# Patient Record
Sex: FEMALE | Race: WHITE | Employment: FULL TIME | URBAN - METROPOLITAN AREA
[De-identification: names, ages, dates, MRNs, and addresses within clinical notes are randomized per-mention and may not be internally consistent; named-entity substitution may affect disease eponyms.]

---

## 2017-03-08 ENCOUNTER — HOSPITAL ENCOUNTER (EMERGENCY)
Age: 47
Discharge: HOME | End: 2017-03-08
Attending: EMERGENCY MEDICINE
Payer: COMMERCIAL

## 2017-03-08 ENCOUNTER — APPOINTMENT (OUTPATIENT)
Dept: GENERAL RADIOLOGY | Age: 47
End: 2017-03-08
Attending: EMERGENCY MEDICINE
Payer: COMMERCIAL

## 2017-03-08 VITALS
HEART RATE: 95 BPM | OXYGEN SATURATION: 95 % | BODY MASS INDEX: 37.56 KG/M2 | RESPIRATION RATE: 20 BRPM | DIASTOLIC BLOOD PRESSURE: 84 MMHG | TEMPERATURE: 97.8 F | HEIGHT: 64 IN | WEIGHT: 220 LBS | SYSTOLIC BLOOD PRESSURE: 135 MMHG

## 2017-03-08 DIAGNOSIS — T17.908A FOREIGN BODY ASPIRATION, INITIAL ENCOUNTER: Primary | ICD-10-CM

## 2017-03-08 PROCEDURE — 99284 EMERGENCY DEPT VISIT MOD MDM: CPT | Performed by: EMERGENCY MEDICINE

## 2017-03-08 PROCEDURE — 71020 XR CHEST PA LAT: CPT

## 2017-03-08 NOTE — ED TRIAGE NOTES
Pt was taking vitamins and states she couldn't get it to go down, go choked and then thinks she aspirated it. Pt is clearing throat and appears mildly uncomfortable.
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
## 2019-03-14 ENCOUNTER — HOSPITAL ENCOUNTER (OUTPATIENT)
Dept: MAMMOGRAPHY | Age: 49
Discharge: HOME | End: 2019-03-14
Attending: FAMILY MEDICINE
Payer: COMMERCIAL

## 2019-03-14 DIAGNOSIS — Z12.39 SCREENING BREAST EXAMINATION: ICD-10-CM

## 2019-03-14 PROCEDURE — 77067 SCR MAMMO BI INCL CAD: CPT

## 2019-03-28 ENCOUNTER — HOSPITAL ENCOUNTER (OUTPATIENT)
Dept: MAMMOGRAPHY | Age: 49
Discharge: HOME | End: 2019-03-28
Attending: FAMILY MEDICINE
Payer: COMMERCIAL

## 2019-03-28 DIAGNOSIS — R92.8 ABNORMAL SCREENING MAMMOGRAM: ICD-10-CM

## 2019-03-28 PROCEDURE — 77065 DX MAMMO INCL CAD UNI: CPT

## 2019-04-10 ENCOUNTER — HOSPITAL ENCOUNTER (OUTPATIENT)
Dept: MAMMOGRAPHY | Age: 49
Discharge: HOME | End: 2019-04-10
Attending: FAMILY MEDICINE
Payer: COMMERCIAL

## 2019-04-10 VITALS
SYSTOLIC BLOOD PRESSURE: 143 MMHG | DIASTOLIC BLOOD PRESSURE: 89 MMHG | OXYGEN SATURATION: 97 % | HEART RATE: 84 BPM | TEMPERATURE: 97.5 F

## 2019-04-10 DIAGNOSIS — R92.8 ABNORMAL MAMMOGRAM OF LEFT BREAST: ICD-10-CM

## 2019-04-10 PROCEDURE — 77065 DX MAMMO INCL CAD UNI: CPT

## 2019-04-10 PROCEDURE — 88305 TISSUE EXAM BY PATHOLOGIST: CPT

## 2019-04-10 PROCEDURE — 19081 BX BREAST 1ST LESION STRTCTC: CPT

## 2019-04-10 PROCEDURE — 74011000250 HC RX REV CODE- 250: Performed by: NURSE PRACTITIONER

## 2019-04-10 PROCEDURE — 74011250636 HC RX REV CODE- 250/636: Performed by: NURSE PRACTITIONER

## 2019-04-10 RX ORDER — LIDOCAINE HYDROCHLORIDE 10 MG/ML
10 INJECTION INFILTRATION; PERINEURAL
Status: COMPLETED | OUTPATIENT
Start: 2019-04-10 | End: 2019-04-10

## 2019-04-10 RX ORDER — LIDOCAINE HYDROCHLORIDE 10 MG/ML
5 INJECTION INFILTRATION; PERINEURAL
Status: COMPLETED | OUTPATIENT
Start: 2019-04-10 | End: 2019-04-10

## 2019-04-10 RX ADMIN — LIDOCAINE HYDROCHLORIDE 3 ML: 10 INJECTION, SOLUTION INFILTRATION; PERINEURAL at 11:10

## 2019-04-10 RX ADMIN — LIDOCAINE HYDROCHLORIDE 3 ML: 10; .005 INJECTION, SOLUTION EPIDURAL; INFILTRATION; INTRACAUDAL; PERINEURAL at 11:10

## 2019-04-10 RX ADMIN — LIDOCAINE HYDROCHLORIDE 10 ML: 10 INJECTION, SOLUTION INFILTRATION; PERINEURAL at 11:09

## 2019-04-10 RX ADMIN — SODIUM CHLORIDE 250 ML: 900 INJECTION, SOLUTION INTRAVENOUS at 11:14

## 2019-12-23 PROBLEM — G56.03 CARPAL TUNNEL SYNDROME, BILATERAL: Status: ACTIVE | Noted: 2018-08-13

## 2020-01-04 ENCOUNTER — HOSPITAL ENCOUNTER (EMERGENCY)
Age: 50
Discharge: HOME | End: 2020-01-04
Attending: EMERGENCY MEDICINE
Payer: COMMERCIAL

## 2020-01-04 VITALS
DIASTOLIC BLOOD PRESSURE: 96 MMHG | BODY MASS INDEX: 34.31 KG/M2 | WEIGHT: 201 LBS | HEART RATE: 78 BPM | SYSTOLIC BLOOD PRESSURE: 166 MMHG | RESPIRATION RATE: 18 BRPM | OXYGEN SATURATION: 96 % | TEMPERATURE: 98.2 F | HEIGHT: 64 IN

## 2020-01-04 DIAGNOSIS — M54.31 SCIATICA OF RIGHT SIDE: Primary | ICD-10-CM

## 2020-01-04 PROCEDURE — 99283 EMERGENCY DEPT VISIT LOW MDM: CPT | Performed by: EMERGENCY MEDICINE

## 2020-01-04 PROCEDURE — 74011250636 HC RX REV CODE- 250/636: Performed by: EMERGENCY MEDICINE

## 2020-01-04 PROCEDURE — 96372 THER/PROPH/DIAG INJ SC/IM: CPT | Performed by: EMERGENCY MEDICINE

## 2020-01-04 RX ORDER — PROMETHAZINE HYDROCHLORIDE 25 MG/ML
25 INJECTION, SOLUTION INTRAMUSCULAR; INTRAVENOUS
Status: COMPLETED | OUTPATIENT
Start: 2020-01-04 | End: 2020-01-04

## 2020-01-04 RX ORDER — HYDROMORPHONE HYDROCHLORIDE 1 MG/ML
1 INJECTION, SOLUTION INTRAMUSCULAR; INTRAVENOUS; SUBCUTANEOUS
Status: COMPLETED | OUTPATIENT
Start: 2020-01-04 | End: 2020-01-04

## 2020-01-04 RX ORDER — KETOROLAC TROMETHAMINE 30 MG/ML
60 INJECTION, SOLUTION INTRAMUSCULAR; INTRAVENOUS
Status: COMPLETED | OUTPATIENT
Start: 2020-01-04 | End: 2020-01-04

## 2020-01-04 RX ORDER — CYCLOBENZAPRINE HCL 10 MG
10 TABLET ORAL
Qty: 30 TAB | Refills: 0 | Status: SHIPPED | OUTPATIENT
Start: 2020-01-04 | End: 2020-10-27 | Stop reason: CLARIF

## 2020-01-04 RX ORDER — DEXAMETHASONE SODIUM PHOSPHATE 100 MG/10ML
10 INJECTION INTRAMUSCULAR; INTRAVENOUS ONCE
Status: COMPLETED | OUTPATIENT
Start: 2020-01-04 | End: 2020-01-04

## 2020-01-04 RX ORDER — HYDROCODONE BITARTRATE AND ACETAMINOPHEN 5; 325 MG/1; MG/1
1-2 TABLET ORAL
Qty: 20 TAB | Refills: 0 | Status: SHIPPED | OUTPATIENT
Start: 2020-01-04 | End: 2020-01-07

## 2020-01-04 RX ADMIN — PROMETHAZINE HYDROCHLORIDE 25 MG: 25 INJECTION INTRAMUSCULAR; INTRAVENOUS at 19:44

## 2020-01-04 RX ADMIN — DEXAMETHASONE SODIUM PHOSPHATE 10 MG: 10 INJECTION INTRAMUSCULAR; INTRAVENOUS at 19:46

## 2020-01-04 RX ADMIN — HYDROMORPHONE HYDROCHLORIDE 1 MG: 1 INJECTION, SOLUTION INTRAMUSCULAR; INTRAVENOUS; SUBCUTANEOUS at 19:46

## 2020-01-04 RX ADMIN — KETOROLAC TROMETHAMINE 60 MG: 30 INJECTION, SOLUTION INTRAMUSCULAR at 19:46

## 2020-01-04 NOTE — ED TRIAGE NOTES
Patient advises she was attempting to get a lid out from under the bottom cabinet and felt a pain in her back. Patient advises lower back pain with radiation to right side. No urinary complaints.
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
## 2020-01-05 NOTE — DISCHARGE INSTRUCTIONS
Use cold packs 5 to 10 minutes, every hour to every-other-hour, for first 48 hours,  Then switch to a heating pad, 5 to 10 minutes, every hour to every-other-hour, for a few days,  Do not go to heat, right away    you should rest today, flat on your back for 45 minutes, and then stand up straight for 15 minutes  Then flat on her back again for 45 minutes, then stand up straight for 15 minutes. If you stay flat on your back all day that will increase the swelling  Continue to repeat this throughout today    Avoid The seated position as much as possible      Patient Education        Back Pain: Care Instructions  Your Care Instructions    Back pain has many possible causes. It is often related to problems with muscles and ligaments of the back. It may also be related to problems with the nerves, discs, or bones of the back. Moving, lifting, standing, sitting, or sleeping in an awkward way can strain the back. Sometimes you don't notice the injury until later. Arthritis is another common cause of back pain. Although it may hurt a lot, back pain usually improves on its own within several weeks. Most people recover in 12 weeks or less. Using good home treatment and being careful not to stress your back can help you feel better sooner. Follow-up care is a key part of your treatment and safety. Be sure to make and go to all appointments, and call your doctor if you are having problems. It's also a good idea to know your test results and keep a list of the medicines you take. How can you care for yourself at home? · Sit or lie in positions that are most comfortable and reduce your pain. Try one of these positions when you lie down:  ? Lie on your back with your knees bent and supported by large pillows. ? Lie on the floor with your legs on the seat of a sofa or chair. ? Lie on your side with your knees and hips bent and a pillow between your legs. ? Lie on your stomach if it does not make pain worse.   · Do not sit up in bed, and avoid soft couches and twisted positions. Bed rest can help relieve pain at first, but it delays healing. Avoid bed rest after the first day of back pain. · Change positions every 30 minutes. If you must sit for long periods of time, take breaks from sitting. Get up and walk around, or lie in a comfortable position. · Try using a heating pad on a low or medium setting for 15 to 20 minutes every 2 or 3 hours. Try a warm shower in place of one session with the heating pad. · You can also try an ice pack for 10 to 15 minutes every 2 to 3 hours. Put a thin cloth between the ice pack and your skin. · Take pain medicines exactly as directed. ? If the doctor gave you a prescription medicine for pain, take it as prescribed. ? If you are not taking a prescription pain medicine, ask your doctor if you can take an over-the-counter medicine. · Take short walks several times a day. You can start with 5 to 10 minutes, 3 or 4 times a day, and work up to longer walks. Walk on level surfaces and avoid hills and stairs until your back is better. · Return to work and other activities as soon as you can. Continued rest without activity is usually not good for your back. · To prevent future back pain, do exercises to stretch and strengthen your back and stomach. Learn how to use good posture, safe lifting techniques, and proper body mechanics. When should you call for help? Call your doctor now or seek immediate medical care if:    · You have new or worsening numbness in your legs.     · You have new or worsening weakness in your legs. (This could make it hard to stand up.)     · You lose control of your bladder or bowels.    Watch closely for changes in your health, and be sure to contact your doctor if:    · You have a fever, lose weight, or don't feel well.     · You do not get better as expected. Where can you learn more? Go to http://alicia-tavo.info/.   Enter F225 in the search box to learn more about \"Back Pain: Care Instructions. \"  Current as of: June 26, 2019  Content Version: 12.2  © 7639-2792 [employer]. Care instructions adapted under license by [employer] (which disclaims liability or warranty for this information). If you have questions about a medical condition or this instruction, always ask your healthcare professional. [address] any warranty or liability for your use of this information.

## 2020-10-16 ENCOUNTER — HOSPITAL ENCOUNTER (OUTPATIENT)
Dept: MAMMOGRAPHY | Age: 50
Discharge: HOME | End: 2020-10-16
Attending: FAMILY MEDICINE
Payer: COMMERCIAL

## 2020-10-16 DIAGNOSIS — Z12.39 SCREENING FOR BREAST CANCER: ICD-10-CM

## 2020-10-16 PROCEDURE — 77067 SCR MAMMO BI INCL CAD: CPT

## 2020-10-30 ENCOUNTER — ANESTHESIA EVENT (OUTPATIENT)
Dept: SURGERY | Age: 50
End: 2020-10-30
Payer: OTHER MISCELLANEOUS

## 2020-10-30 ENCOUNTER — ANESTHESIA (OUTPATIENT)
Dept: SURGERY | Age: 50
End: 2020-10-30
Payer: OTHER MISCELLANEOUS

## 2020-10-30 ENCOUNTER — HOSPITAL ENCOUNTER (OUTPATIENT)
Age: 50
Setting detail: OUTPATIENT SURGERY
Discharge: HOME | End: 2020-10-30
Attending: ORTHOPAEDIC SURGERY | Admitting: ORTHOPAEDIC SURGERY
Payer: OTHER MISCELLANEOUS

## 2020-10-30 VITALS
BODY MASS INDEX: 33.47 KG/M2 | RESPIRATION RATE: 16 BRPM | TEMPERATURE: 98.3 F | OXYGEN SATURATION: 92 % | HEART RATE: 67 BPM | SYSTOLIC BLOOD PRESSURE: 132 MMHG | DIASTOLIC BLOOD PRESSURE: 66 MMHG | WEIGHT: 195 LBS

## 2020-10-30 PROCEDURE — 74011250636 HC RX REV CODE- 250/636: Performed by: NURSE ANESTHETIST, CERTIFIED REGISTERED

## 2020-10-30 PROCEDURE — 76210000020 HC REC RM PH II FIRST 0.5 HR: Performed by: ORTHOPAEDIC SURGERY

## 2020-10-30 PROCEDURE — 77030010509 HC AIRWY LMA MSK TELE -A: Performed by: ANESTHESIOLOGY

## 2020-10-30 PROCEDURE — 2709999900 HC NON-CHARGEABLE SUPPLY: Performed by: ORTHOPAEDIC SURGERY

## 2020-10-30 PROCEDURE — 77030019908 HC STETH ESOPH SIMS -A: Performed by: ANESTHESIOLOGY

## 2020-10-30 PROCEDURE — 74011250636 HC RX REV CODE- 250/636: Performed by: PHYSICIAN ASSISTANT

## 2020-10-30 PROCEDURE — 77030000032 HC CUF TRNQT ZIMM -B: Performed by: ORTHOPAEDIC SURGERY

## 2020-10-30 PROCEDURE — 74011000250 HC RX REV CODE- 250: Performed by: NURSE ANESTHETIST, CERTIFIED REGISTERED

## 2020-10-30 PROCEDURE — 76210000016 HC OR PH I REC 1 TO 1.5 HR: Performed by: ORTHOPAEDIC SURGERY

## 2020-10-30 PROCEDURE — 74011250636 HC RX REV CODE- 250/636: Performed by: ANESTHESIOLOGY

## 2020-10-30 PROCEDURE — 77030020782 HC GWN BAIR PAWS FLX 3M -B: Performed by: ANESTHESIOLOGY

## 2020-10-30 PROCEDURE — 77030011930 HC WND ARTHRO ABLT S&N -C: Performed by: ORTHOPAEDIC SURGERY

## 2020-10-30 PROCEDURE — 76010000138 HC OR TIME 0.5 TO 1 HR: Performed by: ORTHOPAEDIC SURGERY

## 2020-10-30 PROCEDURE — 76060000032 HC ANESTHESIA 0.5 TO 1 HR: Performed by: ORTHOPAEDIC SURGERY

## 2020-10-30 PROCEDURE — 74011250636 HC RX REV CODE- 250/636: Performed by: ORTHOPAEDIC SURGERY

## 2020-10-30 PROCEDURE — 74011250637 HC RX REV CODE- 250/637: Performed by: ANESTHESIOLOGY

## 2020-10-30 PROCEDURE — 77030006668 HC BLD SHV MENSCS STRY -B: Performed by: ORTHOPAEDIC SURGERY

## 2020-10-30 RX ORDER — MIDAZOLAM HYDROCHLORIDE 1 MG/ML
2 INJECTION, SOLUTION INTRAMUSCULAR; INTRAVENOUS
Status: DISCONTINUED | OUTPATIENT
Start: 2020-10-30 | End: 2020-10-30 | Stop reason: HOSPADM

## 2020-10-30 RX ORDER — DIPHENHYDRAMINE HYDROCHLORIDE 50 MG/ML
12.5 INJECTION, SOLUTION INTRAMUSCULAR; INTRAVENOUS
Status: DISCONTINUED | OUTPATIENT
Start: 2020-10-30 | End: 2020-10-30 | Stop reason: HOSPADM

## 2020-10-30 RX ORDER — LIDOCAINE HYDROCHLORIDE 20 MG/ML
INJECTION, SOLUTION EPIDURAL; INFILTRATION; INTRACAUDAL; PERINEURAL AS NEEDED
Status: DISCONTINUED | OUTPATIENT
Start: 2020-10-30 | End: 2020-10-30 | Stop reason: HOSPADM

## 2020-10-30 RX ORDER — SODIUM CHLORIDE 0.9 % (FLUSH) 0.9 %
5-40 SYRINGE (ML) INJECTION AS NEEDED
Status: DISCONTINUED | OUTPATIENT
Start: 2020-10-30 | End: 2020-10-30 | Stop reason: HOSPADM

## 2020-10-30 RX ORDER — OXYCODONE AND ACETAMINOPHEN 5; 325 MG/1; MG/1
1 TABLET ORAL AS NEEDED
Status: DISCONTINUED | OUTPATIENT
Start: 2020-10-30 | End: 2020-10-30 | Stop reason: HOSPADM

## 2020-10-30 RX ORDER — SODIUM CHLORIDE, SODIUM LACTATE, POTASSIUM CHLORIDE, CALCIUM CHLORIDE 600; 310; 30; 20 MG/100ML; MG/100ML; MG/100ML; MG/100ML
75 INJECTION, SOLUTION INTRAVENOUS CONTINUOUS
Status: DISCONTINUED | OUTPATIENT
Start: 2020-10-30 | End: 2020-10-30 | Stop reason: HOSPADM

## 2020-10-30 RX ORDER — LIDOCAINE HYDROCHLORIDE 10 MG/ML
0.1 INJECTION INFILTRATION; PERINEURAL AS NEEDED
Status: DISCONTINUED | OUTPATIENT
Start: 2020-10-30 | End: 2020-10-30 | Stop reason: HOSPADM

## 2020-10-30 RX ORDER — SODIUM CHLORIDE 9 MG/ML
50 INJECTION, SOLUTION INTRAVENOUS CONTINUOUS
Status: DISCONTINUED | OUTPATIENT
Start: 2020-10-30 | End: 2020-10-30 | Stop reason: HOSPADM

## 2020-10-30 RX ORDER — ROPIVACAINE HYDROCHLORIDE 5 MG/ML
INJECTION, SOLUTION EPIDURAL; INFILTRATION; PERINEURAL AS NEEDED
Status: DISCONTINUED | OUTPATIENT
Start: 2020-10-30 | End: 2020-10-30 | Stop reason: HOSPADM

## 2020-10-30 RX ORDER — PROPOFOL 10 MG/ML
INJECTION, EMULSION INTRAVENOUS AS NEEDED
Status: DISCONTINUED | OUTPATIENT
Start: 2020-10-30 | End: 2020-10-30 | Stop reason: HOSPADM

## 2020-10-30 RX ORDER — FENTANYL CITRATE 50 UG/ML
25 INJECTION, SOLUTION INTRAMUSCULAR; INTRAVENOUS ONCE
Status: DISCONTINUED | OUTPATIENT
Start: 2020-10-30 | End: 2020-10-30 | Stop reason: HOSPADM

## 2020-10-30 RX ORDER — SODIUM CHLORIDE, SODIUM LACTATE, POTASSIUM CHLORIDE, CALCIUM CHLORIDE 600; 310; 30; 20 MG/100ML; MG/100ML; MG/100ML; MG/100ML
100 INJECTION, SOLUTION INTRAVENOUS CONTINUOUS
Status: DISCONTINUED | OUTPATIENT
Start: 2020-10-30 | End: 2020-10-30 | Stop reason: HOSPADM

## 2020-10-30 RX ORDER — SODIUM CHLORIDE 0.9 % (FLUSH) 0.9 %
5-40 SYRINGE (ML) INJECTION EVERY 8 HOURS
Status: DISCONTINUED | OUTPATIENT
Start: 2020-10-30 | End: 2020-10-30 | Stop reason: HOSPADM

## 2020-10-30 RX ORDER — CETIRIZINE HCL 10 MG
10 TABLET ORAL
COMMUNITY

## 2020-10-30 RX ORDER — OXYCODONE HYDROCHLORIDE 5 MG/1
5 TABLET ORAL
Status: COMPLETED | OUTPATIENT
Start: 2020-10-30 | End: 2020-10-30

## 2020-10-30 RX ORDER — MIDAZOLAM HYDROCHLORIDE 1 MG/ML
2 INJECTION, SOLUTION INTRAMUSCULAR; INTRAVENOUS ONCE
Status: DISCONTINUED | OUTPATIENT
Start: 2020-10-30 | End: 2020-10-30 | Stop reason: HOSPADM

## 2020-10-30 RX ORDER — HYDROMORPHONE HYDROCHLORIDE 2 MG/ML
0.5 INJECTION, SOLUTION INTRAMUSCULAR; INTRAVENOUS; SUBCUTANEOUS
Status: DISCONTINUED | OUTPATIENT
Start: 2020-10-30 | End: 2020-10-30 | Stop reason: HOSPADM

## 2020-10-30 RX ORDER — DEXAMETHASONE SODIUM PHOSPHATE 4 MG/ML
INJECTION, SOLUTION INTRA-ARTICULAR; INTRALESIONAL; INTRAMUSCULAR; INTRAVENOUS; SOFT TISSUE AS NEEDED
Status: DISCONTINUED | OUTPATIENT
Start: 2020-10-30 | End: 2020-10-30 | Stop reason: HOSPADM

## 2020-10-30 RX ORDER — CEFAZOLIN SODIUM/WATER 2 G/20 ML
2 SYRINGE (ML) INTRAVENOUS ONCE
Status: COMPLETED | OUTPATIENT
Start: 2020-10-30 | End: 2020-10-30

## 2020-10-30 RX ORDER — FAMOTIDINE 20 MG/1
20 TABLET, FILM COATED ORAL ONCE
Status: COMPLETED | OUTPATIENT
Start: 2020-10-30 | End: 2020-10-30

## 2020-10-30 RX ORDER — FENTANYL CITRATE 50 UG/ML
INJECTION, SOLUTION INTRAMUSCULAR; INTRAVENOUS AS NEEDED
Status: DISCONTINUED | OUTPATIENT
Start: 2020-10-30 | End: 2020-10-30 | Stop reason: HOSPADM

## 2020-10-30 RX ORDER — ONDANSETRON 2 MG/ML
INJECTION INTRAMUSCULAR; INTRAVENOUS AS NEEDED
Status: DISCONTINUED | OUTPATIENT
Start: 2020-10-30 | End: 2020-10-30 | Stop reason: HOSPADM

## 2020-10-30 RX ADMIN — FENTANYL CITRATE 25 MCG: 50 INJECTION INTRAMUSCULAR; INTRAVENOUS at 07:47

## 2020-10-30 RX ADMIN — FENTANYL CITRATE 25 MCG: 50 INJECTION INTRAMUSCULAR; INTRAVENOUS at 07:58

## 2020-10-30 RX ADMIN — HYDROMORPHONE HYDROCHLORIDE 0.5 MG: 2 INJECTION INTRAMUSCULAR; INTRAVENOUS; SUBCUTANEOUS at 08:34

## 2020-10-30 RX ADMIN — PROPOFOL 200 MG: 10 INJECTION, EMULSION INTRAVENOUS at 07:38

## 2020-10-30 RX ADMIN — LIDOCAINE HYDROCHLORIDE 40 MG: 20 INJECTION, SOLUTION EPIDURAL; INFILTRATION; INTRACAUDAL; PERINEURAL at 07:38

## 2020-10-30 RX ADMIN — HYDROMORPHONE HYDROCHLORIDE 0.5 MG: 2 INJECTION INTRAMUSCULAR; INTRAVENOUS; SUBCUTANEOUS at 08:55

## 2020-10-30 RX ADMIN — Medication 2 G: at 07:39

## 2020-10-30 RX ADMIN — PROPOFOL 50 MG: 10 INJECTION, EMULSION INTRAVENOUS at 07:45

## 2020-10-30 RX ADMIN — FAMOTIDINE 20 MG: 20 TABLET, FILM COATED ORAL at 06:45

## 2020-10-30 RX ADMIN — SODIUM CHLORIDE, SODIUM LACTATE, POTASSIUM CHLORIDE, AND CALCIUM CHLORIDE 75 ML/HR: 600; 310; 30; 20 INJECTION, SOLUTION INTRAVENOUS at 06:45

## 2020-10-30 RX ADMIN — ONDANSETRON 4 MG: 2 INJECTION INTRAMUSCULAR; INTRAVENOUS at 07:48

## 2020-10-30 RX ADMIN — OXYCODONE 5 MG: 5 TABLET ORAL at 08:33

## 2020-10-30 RX ADMIN — FENTANYL CITRATE 50 MCG: 50 INJECTION INTRAMUSCULAR; INTRAVENOUS at 07:38

## 2020-10-30 RX ADMIN — SODIUM CHLORIDE, SODIUM LACTATE, POTASSIUM CHLORIDE, AND CALCIUM CHLORIDE: 600; 310; 30; 20 INJECTION, SOLUTION INTRAVENOUS at 07:34

## 2020-10-30 RX ADMIN — DEXAMETHASONE SODIUM PHOSPHATE 4 MG: 4 INJECTION, SOLUTION INTRAMUSCULAR; INTRAVENOUS at 07:48

## 2020-10-30 NOTE — ANESTHESIA POSTPROCEDURE EVALUATION
Procedure(s):  KNEE ARTHROSCOPY RIGHT/MEDIAL MENISCECTOMY. general    Anesthesia Post Evaluation      Multimodal analgesia: multimodal analgesia used between 6 hours prior to anesthesia start to PACU discharge  Patient location during evaluation: bedside  Patient participation: complete - patient participated  Level of consciousness: awake  Pain management: adequate  Airway patency: patent  Anesthetic complications: no  Cardiovascular status: acceptable and stable  Respiratory status: acceptable and room air  Hydration status: acceptable  Post anesthesia nausea and vomiting:  none      INITIAL Post-op Vital signs:   Vitals Value Taken Time   /77 10/30/2020  9:11 AM   Temp     Pulse 65 10/30/2020  9:11 AM   Resp 16 10/30/2020  8:52 AM   SpO2 94 % 10/30/2020  9:11 AM   Vitals shown include unvalidated device data.

## 2020-10-30 NOTE — OP NOTES
[facility]  OPERATIVE REPORT    Name:  Aguilar Moore  MR#:  [id number]  :  1970  ACCOUNT #:  [de-identified]  DATE OF SERVICE:  10/30/2020    PREOPERATIVE DIAGNOSES:  1. Chondromalacia of patella - patellofemoral compartment. 2.  Medial meniscal tear and chondromalacia of medial femoral condyle - medial compartment, right knee. POSTOPERATIVE DIAGNOSES:  1. Chondromalacia of patella - patellofemoral compartment. 2.  Medial meniscal tear and chondromalacia of medial femoral condyle - medial compartment, right knee. PROCEDURE PERFORMED:  1. Abrasion arthroplasty of patella - patellofemoral compartment - CPT 29882.  2.  Partial medial meniscectomy and chondroplasty of medial femoral condyle - medial compartment - CPT 33683, right knee. SURGEON:  Donita Glover MD    ASSISTANT:  ANDRE Moreno    ANESTHESIA:  General.    FLUIDS:  Crystalloid. COMPLICATIONS:  None. SPECIMENS REMOVED:  None. IMPLANTS:  None. ESTIMATED BLOOD LOSS:  Minimal.    FINDINGS:  There was grade II, III, IV chondromalacia of the patella, 2 x 2 cm, and grade II, III chondromalacia of the medial femoral condyle, 1.5 x 1.5 cm. There was tearing of the posterior horn of the medial meniscus. PROCEDURE:  After informed consent, the patient was brought to the operating room and placed on the operating room table in the supine position. General anesthesia was administered without difficulty. Tourniquet was applied to the right upper thigh. Right knee and leg were prepped and draped in sterile fashion. Tourniquet was inflated. Standard inferomedial and inferolateral portals were made for scope and instruments. The knee was then arthroscoped in a sequential manner. The aforementioned findings were noted. Attention was directed to the patellofemoral compartment. A chondroplasty of the patella was performed. All loose cartilage flaps were removed.   There were no sharp edges or unstable fragments after the chondroplasty. There was an area of exposed bone and a contained defect on the patella. Abrasion arthroplasty was performed down to bleeding subchondral bone without difficulty. Attention was directed to the medial compartment of the knee. A partial medial meniscectomy was performed. All of the torn portion was removed. At the termination of the partial medial meniscectomy, the remaining meniscal rim had a smooth contour. There were no sharp edges or unstable fragments. A chondroplasty of the medial femoral condyle was performed back to smooth stable area. The knee was thoroughly irrigated. Portals were closed. Sterile dressings were applied. The patient was extubated and taken to the recovery room in stable condition. ANDRE Newby, assisted during the procedure. He was necessary for patient positioning during the procedure and assistance with the major portions of the operation. His presence decreased the operative time and potential complication rate.       MD BRII [id number]  D:  10/30/2020 8:25  T:  10/30/2020 8:52  JOB #:  [id number]

## 2020-10-30 NOTE — ANESTHESIA PREPROCEDURE EVALUATION
Relevant Problems   No relevant active problems       Anesthetic History   No history of anesthetic complications            Review of Systems / Medical History  Patient summary reviewed and pertinent labs reviewed    Pulmonary        Sleep apnea: CPAP           Neuro/Psych         Psychiatric history     Cardiovascular    Hypertension: well controlled              Exercise tolerance: >4 METS     GI/Hepatic/Renal  Within defined limits              Endo/Other        Obesity     Other Findings              Physical Exam    Airway  Mallampati: I  TM Distance: 4 - 6 cm  Neck ROM: normal range of motion   Mouth opening: Normal     Cardiovascular  Regular rate and rhythm,  S1 and S2 normal,  no murmur, click, rub, or gallop  Rhythm: regular  Rate: normal         Dental  No notable dental hx       Pulmonary  Breath sounds clear to auscultation               Abdominal  Abdominal exam normal       Other Findings            Anesthetic Plan    ASA: 2  Anesthesia type: general          Induction: Intravenous  Anesthetic plan and risks discussed with: Patient

## 2020-10-30 NOTE — DISCHARGE INSTRUCTIONS
Post-Operative Instructions   For  Knee Arthroscopy  Phone:  [phone]    1. Unless otherwise instructed, you may place as much weight on your leg as you wish. 2. If you do not have an \"Iceman\" type cooling unit, for the first 48-72 hours following surgery, use ice on the knee every two hours (while awake) for 20-30 minutes at a time to help prevent swelling and lessen pain. If you have a cooling unit, follow the instructions given to you- continually as much as possible the first 48-72 hours, then 3-4 times a day for 4 weeks. Elevate leg. 3. Perform at least 30 to 50 leg-raising exercises twice a day. Begin exercise as soon as pain allows. Also perform gentle active motion of the knee as soon as pain allows. 4. On the third day after surgery, remove the dressing. Leave steri-strips on, they eventually will peel off.      5. Use any pain medication as instructed. You should take your pain medication as soon as you feel the anesthetic wearing off. Do not wait until you are in severe pain to begin taking your pain medication. 6. You may have some side effects from your pain medication. If you have nausea, try taking your medication with food. For itching, you may take over the counter Benadryl. 7. You may shower as soon as desired. The first three days after surgery, wrap the dressings in saran wrap to keep them dry when showering. 8. You may have been given a prescription for Zofran or Phenergan. This medication is used for nausea and vomiting. You do not need to get this prescription filled unless you have a problem. 9. If you have a problem, please call [address] [place] [address] at [phone]    [address] ACTIVITY  · As tolerated and as directed by your doctor. · Bathe or shower as directed by your doctor. DIET  · Clear liquids until no nausea or vomiting; then light diet for the first day.   · Advance to regular diet on second day, unless your doctor orders otherwise. · If nausea and vomiting continues, call your doctor. PAIN  · Take pain medication as directed by your doctor. · Call your doctor if pain is NOT relieved by medication. · DO NOT take aspirin of blood thinners unless directed by your doctor. CALL YOUR DOCTOR IF   · Excessive bleeding that does not stop after holding pressure over the area  · Temperature of 101 degrees F or above  · Excessive redness, swelling or bruising, and/ or green or yellow, smelly discharge from incision        After general anesthesia or intravenous sedation, for 24 hours or while taking prescription Narcotics:  · Limit your activities  · Do not drive and operate hazardous machinery  · Do not make important personal or business decisions  · Do  not drink alcoholic beverages  · If you have not urinated within 8 hours after discharge, please contact your surgeon on call. *  Please give a list of your current medications to your Primary Care Provider. *  Please update this list whenever your medications are discontinued, doses are      changed, or new medications (including over-the-counter products) are added. *  Please carry medication information at all times in case of emergency situations. These are general instructions for a healthy lifestyle:    No smoking/ No tobacco products/ Avoid exposure to second hand smoke    Surgeon General's Warning:  Quitting smoking now greatly reduces serious risk to your health.     Obesity, smoking, and sedentary lifestyle greatly increases your risk for illness    A healthy diet, regular physical exercise & weight monitoring are important for maintaining a healthy lifestyle    You may be retaining fluid if you have a history of heart failure or if you experience any of the following symptoms:  Weight gain of 3 pounds or more overnight or 5 pounds in a week, increased swelling in our hands or feet or shortness of breath while lying flat in bed. Please call your doctor as soon as you notice any of these symptoms; do not wait until your next office visit. Recognize signs and symptoms of STROKE:    F-face looks uneven    A-arms unable to move or move unevenly    S-speech slurred or non-existent    T-time-call 911 as soon as signs and symptoms begin-DO NOT go       Back to bed or wait to see if you get better-TIME IS BRAIN.
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
## 2021-07-27 ENCOUNTER — APPOINTMENT (OUTPATIENT)
Dept: GENERAL RADIOLOGY | Age: 51
End: 2021-07-27
Attending: PHYSICIAN ASSISTANT
Payer: COMMERCIAL

## 2021-07-27 ENCOUNTER — HOSPITAL ENCOUNTER (EMERGENCY)
Age: 51
Discharge: HOME | End: 2021-07-27
Attending: EMERGENCY MEDICINE
Payer: COMMERCIAL

## 2021-07-27 VITALS
RESPIRATION RATE: 18 BRPM | HEIGHT: 64 IN | TEMPERATURE: 97.9 F | HEART RATE: 82 BPM | SYSTOLIC BLOOD PRESSURE: 142 MMHG | DIASTOLIC BLOOD PRESSURE: 73 MMHG | OXYGEN SATURATION: 99 % | BODY MASS INDEX: 33.47 KG/M2

## 2021-07-27 DIAGNOSIS — S61.213A LACERATION OF LEFT MIDDLE FINGER WITHOUT DAMAGE TO NAIL, FOREIGN BODY PRESENCE UNSPECIFIED, INITIAL ENCOUNTER: Primary | ICD-10-CM

## 2021-07-27 PROCEDURE — 73140 X-RAY EXAM OF FINGER(S): CPT

## 2021-07-27 PROCEDURE — 99284 EMERGENCY DEPT VISIT MOD MDM: CPT

## 2021-07-27 RX ORDER — HYDROCODONE BITARTRATE AND ACETAMINOPHEN 5; 325 MG/1; MG/1
1 TABLET ORAL
Qty: 10 TABLET | Refills: 0 | Status: SHIPPED | OUTPATIENT
Start: 2021-07-27 | End: 2021-07-30

## 2021-07-27 RX ORDER — NALOXONE HYDROCHLORIDE 4 MG/.1ML
SPRAY NASAL
Qty: 1 EACH | Refills: 0 | Status: SHIPPED | OUTPATIENT
Start: 2021-07-27 | End: 2021-08-13

## 2021-07-27 NOTE — ED NOTES
Pt c/o left middle pain with bruising and laceration after getting it caught while taking down a tent at 0930 this morning. Bleeding controlled in triage.

## 2021-07-27 NOTE — Clinical Note
[address] [address] EMERGENCY DEPT  [facility] [address]  [phone]    Work/School Note    Date: 7/27/2021    To Whom It May concern:    Dimitry Jacobson was seen and treated today in the emergency room by the following provider(s):  Attending Provider: Liz Stanford MD  Physician Assistant: Clotilda Litten, PA. Dimitry Jacobson is excused from work/school on 7/27/2021 through 7/30/2021. She is medically clear to return to work/school on 7/31/2021.         Sincerely,          ANDRE Barbosa

## 2021-07-27 NOTE — ED NOTES
I have reviewed discharge instructions with the patient. The patient verbalized understanding. Patient left ED via Discharge Method: ambulatory to Home. Opportunity for questions and clarification provided. Patient given 2 scripts. To continue your aftercare when you leave the hospital, you may receive an automated call from our care team to check in on how you are doing. This is a free service and part of our promise to provide the best care and service to meet your aftercare needs.  If you have questions, or wish to unsubscribe from this service please call [phone]. Thank you for Choosing our [facility] Emergency Department.
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
## 2021-09-17 ENCOUNTER — TRANSCRIBE ORDER (OUTPATIENT)
Dept: REGISTRATION | Age: 51
End: 2021-09-17

## 2021-09-17 DIAGNOSIS — Z12.31 SCREENING MAMMOGRAM FOR HIGH-RISK PATIENT: Primary | ICD-10-CM

## 2021-11-01 ENCOUNTER — HOSPITAL ENCOUNTER (OUTPATIENT)
Dept: MAMMOGRAPHY | Age: 51
Discharge: HOME | End: 2021-11-01
Attending: FAMILY MEDICINE
Payer: COMMERCIAL

## 2021-11-01 DIAGNOSIS — Z12.31 SCREENING MAMMOGRAM FOR HIGH-RISK PATIENT: ICD-10-CM

## 2021-11-01 PROCEDURE — 77067 SCR MAMMO BI INCL CAD: CPT

## 2022-03-18 PROBLEM — G56.03 CARPAL TUNNEL SYNDROME, BILATERAL: Status: ACTIVE | Noted: 2018-08-13

## 2022-03-28 ENCOUNTER — HOSPITAL ENCOUNTER (OUTPATIENT)
Dept: LAB | Age: 52
Discharge: HOME | End: 2022-03-28

## 2022-03-28 PROCEDURE — 88305 TISSUE EXAM BY PATHOLOGIST: CPT

## 2022-07-04 ENCOUNTER — HOSPITAL ENCOUNTER (EMERGENCY)
Age: 52
Discharge: HOME | End: 2022-07-04
Attending: EMERGENCY MEDICINE
Payer: COMMERCIAL

## 2022-07-04 VITALS
OXYGEN SATURATION: 96 % | WEIGHT: 179 LBS | DIASTOLIC BLOOD PRESSURE: 80 MMHG | HEART RATE: 86 BPM | RESPIRATION RATE: 17 BRPM | SYSTOLIC BLOOD PRESSURE: 126 MMHG | TEMPERATURE: 98.6 F | BODY MASS INDEX: 30.56 KG/M2 | HEIGHT: 64 IN

## 2022-07-04 DIAGNOSIS — T63.443A BEE STING, ASSAULT, INITIAL ENCOUNTER: Primary | ICD-10-CM

## 2022-07-04 PROCEDURE — 6370000000 HC RX 637 (ALT 250 FOR IP): Performed by: EMERGENCY MEDICINE

## 2022-07-04 PROCEDURE — 96372 THER/PROPH/DIAG INJ SC/IM: CPT

## 2022-07-04 PROCEDURE — 99284 EMERGENCY DEPT VISIT MOD MDM: CPT

## 2022-07-04 PROCEDURE — 6360000002 HC RX W HCPCS: Performed by: EMERGENCY MEDICINE

## 2022-07-04 RX ORDER — FAMOTIDINE 20 MG/1
20 TABLET, FILM COATED ORAL
Status: COMPLETED | OUTPATIENT
Start: 2022-07-04 | End: 2022-07-04

## 2022-07-04 RX ORDER — METHYLPREDNISOLONE 4 MG/1
TABLET ORAL
Qty: 1 KIT | Refills: 0 | Status: SHIPPED | OUTPATIENT
Start: 2022-07-04 | End: 2022-09-01 | Stop reason: ALTCHOICE

## 2022-07-04 RX ORDER — HYDROXYZINE HYDROCHLORIDE 25 MG/1
25 TABLET, FILM COATED ORAL EVERY 8 HOURS PRN
Qty: 30 TABLET | Refills: 0 | Status: SHIPPED | OUTPATIENT
Start: 2022-07-04 | End: 2022-07-14

## 2022-07-04 RX ORDER — EPINEPHRINE 0.3 MG/.3ML
0.3 INJECTION SUBCUTANEOUS ONCE
Qty: 0.3 ML | Refills: 0 | Status: SHIPPED | OUTPATIENT
Start: 2022-07-04 | End: 2022-09-01

## 2022-07-04 RX ORDER — FAMOTIDINE 20 MG/1
20 TABLET, FILM COATED ORAL 2 TIMES DAILY
Qty: 10 TABLET | Refills: 0 | Status: SHIPPED | OUTPATIENT
Start: 2022-07-04 | End: 2022-09-01 | Stop reason: ALTCHOICE

## 2022-07-04 RX ORDER — HYDROXYZINE PAMOATE 25 MG/1
25 CAPSULE ORAL
Status: COMPLETED | OUTPATIENT
Start: 2022-07-04 | End: 2022-07-04

## 2022-07-04 RX ORDER — METHYLPREDNISOLONE SODIUM SUCCINATE 125 MG/2ML
125 INJECTION, POWDER, LYOPHILIZED, FOR SOLUTION INTRAMUSCULAR; INTRAVENOUS
Status: COMPLETED | OUTPATIENT
Start: 2022-07-04 | End: 2022-07-04

## 2022-07-04 RX ADMIN — METHYLPREDNISOLONE SODIUM SUCCINATE 125 MG: 125 INJECTION, POWDER, FOR SOLUTION INTRAMUSCULAR; INTRAVENOUS at 21:24

## 2022-07-04 RX ADMIN — HYDROXYZINE PAMOATE 25 MG: 25 CAPSULE ORAL at 21:24

## 2022-07-04 RX ADMIN — FAMOTIDINE 20 MG: 20 TABLET ORAL at 21:24

## 2022-07-04 ASSESSMENT — PAIN DESCRIPTION - LOCATION: LOCATION: HAND

## 2022-07-04 ASSESSMENT — PAIN SCALES - GENERAL: PAINLEVEL_OUTOF10: 5

## 2022-07-04 ASSESSMENT — PAIN DESCRIPTION - ORIENTATION: ORIENTATION: RIGHT

## 2022-07-04 ASSESSMENT — ENCOUNTER SYMPTOMS
RESPIRATORY NEGATIVE: 1
COLOR CHANGE: 1

## 2022-07-04 ASSESSMENT — PAIN - FUNCTIONAL ASSESSMENT: PAIN_FUNCTIONAL_ASSESSMENT: 0-10

## 2022-07-04 ASSESSMENT — PAIN DESCRIPTION - DESCRIPTORS: DESCRIPTORS: ITCHING

## 2022-07-04 ASSESSMENT — PAIN DESCRIPTION - PAIN TYPE: TYPE: ACUTE PAIN

## 2022-07-05 ENCOUNTER — TELEPHONE (OUTPATIENT)
Dept: INTERNAL MEDICINE CLINIC | Facility: CLINIC | Age: 52
End: 2022-07-05

## 2022-07-05 NOTE — ED NOTES
I have reviewed discharge instructions with the patient. The patient verbalized understanding. Patient left ED via Discharge Method: ambulatory to Home with   . Opportunity for questions and clarification provided. Patient given 4 scripts. To continue your aftercare when you leave the hospital, you may receive an automated call from our care team to check in on how you are doing. This is a free service and part of our promise to provide the best care and service to meet your aftercare needs.  If you have questions, or wish to unsubscribe from this service please call [phone]. Thank you for Choosing our [facility] Emergency Department.         [facility]  07/04/22 3405
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
## 2022-07-05 NOTE — TELEPHONE ENCOUNTER
----- Message from Jackelin Poon sent at 7/5/2022  1:09 PM EDT -----  Subject: Message to Provider    QUESTIONS  Information for Provider? Patient would like labs orders for her 8/23   appt. she also needs a lab appt prior to that appt. She wants the AM but   not too early. Please call.  ---------------------------------------------------------------------------  --------------  Sabino Murcia [facility]  [phone]; OK to leave message on voicemail  ---------------------------------------------------------------------------  --------------  SCRIPT ANSWERS  Relationship to Patient?  Self

## 2022-07-05 NOTE — ED TRIAGE NOTES
Pt c/o right hand pain and swelling after getting stung by a bee on right thumb. Pt denies difficulty swallowing or breathing. Pt states she took benadryl around 1730.

## 2022-08-15 DIAGNOSIS — R73.01 IMPAIRED FASTING GLUCOSE: Primary | ICD-10-CM

## 2022-08-15 DIAGNOSIS — E78.00 PURE HYPERCHOLESTEROLEMIA, UNSPECIFIED: ICD-10-CM

## 2022-08-16 ENCOUNTER — NURSE ONLY (OUTPATIENT)
Dept: INTERNAL MEDICINE CLINIC | Facility: CLINIC | Age: 52
End: 2022-08-16

## 2022-08-16 DIAGNOSIS — E78.00 PURE HYPERCHOLESTEROLEMIA, UNSPECIFIED: ICD-10-CM

## 2022-08-16 DIAGNOSIS — R73.01 IMPAIRED FASTING GLUCOSE: ICD-10-CM

## 2022-08-16 LAB
ALBUMIN SERPL-MCNC: 3.9 G/DL (ref 3.5–5)
ALBUMIN/GLOB SERPL: 1.2 {RATIO} (ref 1.2–3.5)
ALP SERPL-CCNC: 95 U/L (ref 50–136)
ALT SERPL-CCNC: 24 U/L (ref 12–65)
ANION GAP SERPL CALC-SCNC: 1 MMOL/L (ref 7–16)
AST SERPL-CCNC: 18 U/L (ref 15–37)
BILIRUB SERPL-MCNC: 0.4 MG/DL (ref 0.2–1.1)
BUN SERPL-MCNC: 15 MG/DL (ref 6–23)
CALCIUM SERPL-MCNC: 9.1 MG/DL (ref 8.3–10.4)
CHLORIDE SERPL-SCNC: 112 MMOL/L (ref 98–107)
CHOLEST SERPL-MCNC: 244 MG/DL
CO2 SERPL-SCNC: 28 MMOL/L (ref 21–32)
CREAT SERPL-MCNC: 0.8 MG/DL (ref 0.6–1)
ERYTHROCYTE [DISTWIDTH] IN BLOOD BY AUTOMATED COUNT: 12.4 % (ref 11.9–14.6)
EST. AVERAGE GLUCOSE BLD GHB EST-MCNC: 114 MG/DL
GLOBULIN SER CALC-MCNC: 3.3 G/DL (ref 2.3–3.5)
GLUCOSE SERPL-MCNC: 88 MG/DL (ref 65–100)
HBA1C MFR BLD: 5.6 % (ref 4.8–5.6)
HCT VFR BLD AUTO: 41.4 % (ref 35.8–46.3)
HDLC SERPL-MCNC: 71 MG/DL (ref 40–60)
HDLC SERPL: 3.4 {RATIO}
HGB BLD-MCNC: 13.3 G/DL (ref 11.7–15.4)
LDLC SERPL CALC-MCNC: 148.8 MG/DL
MCH RBC QN AUTO: 30.6 PG (ref 26.1–32.9)
MCHC RBC AUTO-ENTMCNC: 32.1 G/DL (ref 31.4–35)
MCV RBC AUTO: 95.4 FL (ref 79.6–97.8)
NRBC # BLD: 0 K/UL (ref 0–0.2)
PLATELET # BLD AUTO: 289 K/UL (ref 150–450)
PMV BLD AUTO: 10.7 FL (ref 9.4–12.3)
POTASSIUM SERPL-SCNC: 4.5 MMOL/L (ref 3.5–5.1)
PROT SERPL-MCNC: 7.2 G/DL (ref 6.3–8.2)
RBC # BLD AUTO: 4.34 M/UL (ref 4.05–5.2)
SODIUM SERPL-SCNC: 141 MMOL/L (ref 136–145)
TRIGL SERPL-MCNC: 121 MG/DL (ref 35–150)
VLDLC SERPL CALC-MCNC: 24.2 MG/DL (ref 6–23)
WBC # BLD AUTO: 5.5 K/UL (ref 4.3–11.1)
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
## 2022-09-01 ENCOUNTER — OFFICE VISIT (OUTPATIENT)
Dept: INTERNAL MEDICINE CLINIC | Facility: CLINIC | Age: 52
End: 2022-09-01
Payer: COMMERCIAL

## 2022-09-01 VITALS
OXYGEN SATURATION: 97 % | BODY MASS INDEX: 29.02 KG/M2 | HEART RATE: 78 BPM | WEIGHT: 170 LBS | HEIGHT: 64 IN | DIASTOLIC BLOOD PRESSURE: 70 MMHG | SYSTOLIC BLOOD PRESSURE: 120 MMHG | RESPIRATION RATE: 14 BRPM

## 2022-09-01 DIAGNOSIS — I10 ESSENTIAL HYPERTENSION, BENIGN: ICD-10-CM

## 2022-09-01 DIAGNOSIS — Z12.83 SKIN CANCER SCREENING: ICD-10-CM

## 2022-09-01 DIAGNOSIS — E78.00 PURE HYPERCHOLESTEROLEMIA, UNSPECIFIED: ICD-10-CM

## 2022-09-01 DIAGNOSIS — R10.11 RUQ PAIN: ICD-10-CM

## 2022-09-01 DIAGNOSIS — R73.01 IMPAIRED FASTING GLUCOSE: ICD-10-CM

## 2022-09-01 DIAGNOSIS — Z00.00 ROUTINE GENERAL MEDICAL EXAMINATION AT A HEALTH CARE FACILITY: Primary | ICD-10-CM

## 2022-09-01 DIAGNOSIS — Z11.4 SCREENING FOR HIV (HUMAN IMMUNODEFICIENCY VIRUS): ICD-10-CM

## 2022-09-01 DIAGNOSIS — Z11.59 NEED FOR HEPATITIS C SCREENING TEST: ICD-10-CM

## 2022-09-01 DIAGNOSIS — F41.8 DEPRESSION WITH ANXIETY: ICD-10-CM

## 2022-09-01 PROCEDURE — 99396 PREV VISIT EST AGE 40-64: CPT | Performed by: FAMILY MEDICINE

## 2022-09-01 RX ORDER — ATORVASTATIN CALCIUM 10 MG/1
10 TABLET, FILM COATED ORAL DAILY
Qty: 90 TABLET | Refills: 3 | Status: SHIPPED | OUTPATIENT
Start: 2022-09-01 | End: 2023-08-27

## 2022-09-01 RX ORDER — ALPRAZOLAM 1 MG/1
1 TABLET ORAL 2 TIMES DAILY PRN
Qty: 60 TABLET | Refills: 0 | Status: SHIPPED | OUTPATIENT
Start: 2022-09-01 | End: 2022-10-01

## 2022-09-01 ASSESSMENT — ENCOUNTER SYMPTOMS
SHORTNESS OF BREATH: 0
WHEEZING: 0
NAUSEA: 0
EYE PAIN: 0
DIARRHEA: 0
ABDOMINAL PAIN: 1
CONSTIPATION: 0
BACK PAIN: 0
VOMITING: 0
BLOOD IN STOOL: 0
SORE THROAT: 0

## 2022-09-01 ASSESSMENT — PATIENT HEALTH QUESTIONNAIRE - PHQ9
SUM OF ALL RESPONSES TO PHQ QUESTIONS 1-9: 0
SUM OF ALL RESPONSES TO PHQ9 QUESTIONS 1 & 2: 0
2. FEELING DOWN, DEPRESSED OR HOPELESS: 0
1. LITTLE INTEREST OR PLEASURE IN DOING THINGS: 0
SUM OF ALL RESPONSES TO PHQ QUESTIONS 1-9: 0

## 2022-09-07 ENCOUNTER — HOSPITAL ENCOUNTER (OUTPATIENT)
Dept: ULTRASOUND IMAGING | Age: 52
Discharge: HOME | End: 2022-09-10
Payer: COMMERCIAL

## 2022-09-07 DIAGNOSIS — R10.11 RUQ PAIN: ICD-10-CM

## 2022-09-07 PROCEDURE — 76705 ECHO EXAM OF ABDOMEN: CPT

## 2022-09-09 ENCOUNTER — NURSE TRIAGE (OUTPATIENT)
Dept: OTHER | Facility: CLINIC | Age: 52
End: 2022-09-09

## 2022-09-09 NOTE — TELEPHONE ENCOUNTER
Received call from Alf Tan at [facility] with [employer] Complaint. Subjective: Caller states \"left knee pain for 5 days, and injured the knee last night\" Was recently given Lipitor. Denies swelling. Last night she was moving something and made the knee worse. Hurts on both inside and outside of the left knee. Current Symptoms: see above    Onset: 5 days ago; worsening    Associated Symptoms: reduced activity    Pain Severity: 9/10; aching; only when she is up and moving    Temperature: n/a     What has been tried: diclofenac    LMP: NA Pregnant: NA    Recommended disposition: See PCP within 3 Days    Care advice provided, patient verbalizes understanding; denies any other questions or concerns; instructed to call back for any new or worsening symptoms. Patient/Caller agrees with recommended disposition; writer provided warm transfer to [employer] at [facility] for appointment scheduling     Attention Provider: Thank you for allowing me to participate in the care of your patient. The patient was connected to triage in response to information provided to the ECC/PSC. Please do not respond through this encounter as the response is not directed to a shared pool.        Reason for Disposition   MODERATE pain (e.g., symptoms interfere with work or school, limping) and present > 3 days    Protocols used: Knee Pain-ADULT-[place]

## 2022-09-10 ENCOUNTER — APPOINTMENT (OUTPATIENT)
Dept: GENERAL RADIOLOGY | Age: 52
End: 2022-09-10
Payer: COMMERCIAL

## 2022-09-10 ENCOUNTER — HOSPITAL ENCOUNTER (EMERGENCY)
Age: 52
Discharge: HOME | End: 2022-09-10
Attending: STUDENT IN AN ORGANIZED HEALTH CARE EDUCATION/TRAINING PROGRAM
Payer: COMMERCIAL

## 2022-09-10 VITALS
TEMPERATURE: 98.1 F | OXYGEN SATURATION: 97 % | HEART RATE: 72 BPM | RESPIRATION RATE: 18 BRPM | WEIGHT: 180 LBS | SYSTOLIC BLOOD PRESSURE: 146 MMHG | DIASTOLIC BLOOD PRESSURE: 90 MMHG | BODY MASS INDEX: 30.73 KG/M2 | HEIGHT: 64 IN

## 2022-09-10 DIAGNOSIS — M25.562 ACUTE PAIN OF LEFT KNEE: Primary | ICD-10-CM

## 2022-09-10 PROCEDURE — 73562 X-RAY EXAM OF KNEE 3: CPT

## 2022-09-10 PROCEDURE — 99283 EMERGENCY DEPT VISIT LOW MDM: CPT

## 2022-09-10 RX ORDER — HYDROCODONE BITARTRATE AND ACETAMINOPHEN 5; 325 MG/1; MG/1
1 TABLET ORAL EVERY 6 HOURS PRN
Qty: 8 TABLET | Refills: 0 | Status: SHIPPED | OUTPATIENT
Start: 2022-09-10 | End: 2022-09-13

## 2022-09-10 RX ORDER — NALOXONE HYDROCHLORIDE 2 MG/.4ML
2 INJECTION, SOLUTION INTRAMUSCULAR; SUBCUTANEOUS
Qty: 0.4 ML | Refills: 0 | Status: SHIPPED | OUTPATIENT
Start: 2022-09-10 | End: 2022-09-10

## 2022-09-10 ASSESSMENT — ENCOUNTER SYMPTOMS
COUGH: 0
PHOTOPHOBIA: 0
SHORTNESS OF BREATH: 0
VOMITING: 0
SORE THROAT: 0
NAUSEA: 0
DIARRHEA: 0

## 2022-09-10 ASSESSMENT — PAIN - FUNCTIONAL ASSESSMENT
PAIN_FUNCTIONAL_ASSESSMENT: 0-10
PAIN_FUNCTIONAL_ASSESSMENT: 0-10

## 2022-09-10 ASSESSMENT — PAIN DESCRIPTION - DESCRIPTORS: DESCRIPTORS: ACHING

## 2022-09-10 ASSESSMENT — PAIN DESCRIPTION - LOCATION
LOCATION: KNEE
LOCATION: KNEE

## 2022-09-10 ASSESSMENT — PAIN DESCRIPTION - ORIENTATION
ORIENTATION: LEFT
ORIENTATION: LEFT;OUTER

## 2022-09-10 ASSESSMENT — PAIN SCALES - GENERAL
PAINLEVEL_OUTOF10: 5
PAINLEVEL_OUTOF10: 8

## 2022-09-10 NOTE — DISCHARGE INSTRUCTIONS
Alternate Tylenol and Motrin as needed for pain. Use Norco for breakthrough pain. Follow-up with orthopedics this week as previously scheduled. Turn to the ER for worsening or worrisome symptoms.

## 2022-09-10 NOTE — ED NOTES
I have reviewed discharge instructions with the patient. The patient verbalized understanding. Patient left ED via Discharge Method: ambulatory to Home with Mother. Opportunity for questions and clarification provided. Patient given 2 scripts. To continue your aftercare when you leave the hospital, you may receive an automated call from our care team to check in on how you are doing. This is a free service and part of our promise to provide the best care and service to meet your aftercare needs.  If you have questions, or wish to unsubscribe from this service please call [phone]. Thank you for Choosing our [facility] Emergency Department.        Lyly Fuentes RN  09/10/22 [id number]
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
## 2022-09-15 ENCOUNTER — OFFICE VISIT (OUTPATIENT)
Dept: ORTHOPEDIC SURGERY | Age: 52
End: 2022-09-15
Payer: COMMERCIAL

## 2022-09-15 ENCOUNTER — HOSPITAL ENCOUNTER (OUTPATIENT)
Dept: GENERAL RADIOLOGY | Age: 52
Discharge: HOME | End: 2022-09-17

## 2022-09-15 DIAGNOSIS — M25.362 KNEE INSTABILITY, LEFT: ICD-10-CM

## 2022-09-15 DIAGNOSIS — M25.562 LEFT KNEE PAIN, UNSPECIFIED CHRONICITY: Primary | ICD-10-CM

## 2022-09-15 DIAGNOSIS — M25.562 LEFT KNEE PAIN, UNSPECIFIED CHRONICITY: ICD-10-CM

## 2022-09-15 PROCEDURE — 99203 OFFICE O/P NEW LOW 30 MIN: CPT | Performed by: STUDENT IN AN ORGANIZED HEALTH CARE EDUCATION/TRAINING PROGRAM

## 2022-09-15 NOTE — PROGRESS NOTES
Name: Edward Dickinson  YOB: 1970  Gender: female  MRN: [id number]  Date of Encounter:  9/15/2022       CHIEF COMPLAINT:     Chief Complaint   Patient presents with    Knee Pain     Left knee pain        SUBJECTIVE/OBJECTIVE:      HPI:    Patient is a 50 y.o. pleasant female who presents today for a new evaluation of her left knee. She has previously had issues to bilateral knees. Her left knee has been painful for around 4 weeks, but acutely worsened last week when pushing a heavy load at work. She had significant pain with weight bearing over the weekend and went to the ER where she had imaging. She denies any mechanism of injury, twisting, popping or clicks. She has had no swelling or erythema. She took diclofenac and symptoms have signficantly improved since that time, but she feels unsteady on her knee and is wary of normal activity. She wears a knee sleeve for compression which is helpful. Date of injury / symptom onset: 4 weeks    She had an arthroscopic surgery on the right in the past.      PAST HISTORY:   Past medical, surgical, family, social history and allergies reviewed by me. Pertinent history:   Tobacco use:  reports that she quit smoking about 8 years ago. Her smoking use included cigarettes. She started smoking about 29 years ago. She smoked an average of 1 pack per day. She has never used smokeless tobacco.  Diabetes: none  Anticoagulation: no      REVIEW OF SYSTEMS:   As noted in HPI. PHYSICAL EXAMINATION:     Gen: Well-developed, no acute distress   HEENT: NC/AT, EOMI   Neck: Trachea midline, normal ROM   CV: Regular rhythm by palpation of distal pulse, normal capillary refill   Pulm: No respiratory distress, no stridor   Psychiatric: Well oriented, normal mood and affect. Skin: No rashes, lesions or ulcers, normal temperature, turgor, and texture on uninvolved extremity.       ORTHO EXAM:     Left KNEE:     Alignment: normal  Inspection: No deformity, No edema, and No ecchymosis  Palpation: Effusion  none; Crepitus Negative  Tenderness: None  Provocative testing: Negative Lachman, Mary lateral, Mary medial, Anterior drawer, Posterior drawer  Strength: Extensor mechanism intact. Normal quad strength 5/5. Abductor strength 4+. Some discomfort and internal rotation with single leg squat  Sensation: intact to light touch   Capillary refill normal    Gait: Normal  Normal ROM of hip with negative scour. DIAGNOSTIC IMAGING:   3 view XR left knee previously obtained reviewed. Additional weight bearing AP view obtained today. Images show mild medial compartment arthritis, no effusion or soft tissue swelling. Imaging reviewed and interpreted by me independent of radiology. ASSESSMENT/PLAN:   1. Left knee pain, unspecified chronicity    2. Knee instability, left       No significant tenderness on exam. Does have weakness in abductors of hip, would benefit from strengthening / PT for stability. Plan:     Continue NSAIDs as needed for pain, including oral and topical.     Advised her to perform physical therapy for leg and gluteus strengthening for her stability. Patient would prefer to continue using her knee compression sleeve instead of bracing. Orders Placed This Encounter   Procedures    XR KNEE LEFT (1-2 VIEWS)     Left knee AP Standing     Standing Status:   Future     Number of Occurrences:   1     Standing Expiration Date:   9/15/2023    Ambulatory referral to Physical Therapy     Referral Priority:   Routine     Referral Type:   Eval and Treat     Referral Reason:   Patient Preference     Number of Visits Requested:   1       Return in about 6 weeks (around 10/27/2022). The patient expressed understanding and agreed with the plan. Freeman Robb MD   [facility] and [facility]     This document was created using voice recognition software so frequent mistakes are possible.  For any concerns about the wording of this document, please contact its creator for further clarification.

## 2022-09-19 ENCOUNTER — HOSPITAL ENCOUNTER (OUTPATIENT)
Dept: PHYSICAL THERAPY | Age: 52
Setting detail: RECURRING SERIES
Discharge: HOME | End: 2022-09-22
Payer: COMMERCIAL

## 2022-09-19 PROCEDURE — 97110 THERAPEUTIC EXERCISES: CPT

## 2022-09-19 PROCEDURE — 97161 PT EVAL LOW COMPLEX 20 MIN: CPT

## 2022-09-19 ASSESSMENT — PAIN SCALES - GENERAL: PAINLEVEL_OUTOF10: 0

## 2022-09-19 NOTE — PROGRESS NOTES
Darryn Velasquez  : 1970  Primary: [place]  Secondary:  [address] @ [address] [address] [place] [address] [address]  Phone: [phone]  Fax: [phone] Plan Frequency: 2 x per week for 6 weeks  Plan of Care/Certification Expiration Date: 10/31/22    PT Visit Info:  Visit Count:  1   OUTPATIENT PHYSICAL THERAPY:OP NOTE TYPE: Treatment Note 2022       Episode  }Appt Desk             Treatment Diagnosis:  Pain in Left Knee (M25.562)  Other abnormalities of gait and mobility (R26.89)  Medical/Referring Diagnosis:  Left knee pain, unspecified chronicity [M25.562]  Referring Physician:  Sanna Lang MD MD Orders:  PT Eval and Treat 2 x 6 weeks   Date of Onset:  Onset Date: 22   Allergies:   Bee pollen and Bee venom  Restrictions/Precautions:  Restrictions/Precautions: None  Required Braces or Orthoses?: NoNo data recorded   Interventions Planned (Treatment may consist of any combination of the following):    Current Treatment Recommendations: Strengthening; Balance training; Functional mobility training; Transfer training; Gait training; Stair training; Endurance training; Neuromuscular re-education; Manual Therapy - Joint Manipulation; Pain management; Return to work related activity; Home exercise program; Safety education & training; Modalities; Patient/Caregiver education & training; Therapeutic activities   Subjective Comments:pain left knee with side movements      Initial:}    0/10Post Session:       0/10  Medications Last Reviewed:  2022  Updated Objective Findings:  See evaluation note from today  Treatment   THERAPEUTIC EXERCISE: (25  minutes):    Exercises per grid below to improve mobility, strength, balance, and coordination. Required moderate visual, verbal, manual, and tactile cues to promote proper body alignment, promote proper body posture, and promote proper body mechanics.   Progressed resistance, range, and repetitions as indicated. Date:  9/19/22 Date:   Date:     Activity/Exercise Parameters Parameters Parameters   Prone hip extension with bent knee  X 10, 5 sec hold      clamshell X 10 B     Shuttle  NV     Nustep  NV     Prone hip ER/IR X 5      Sit to stand X 3      Calf raise  NV     SLS Assessed      Step ups  NV? Gait  150 ft          Treatment/Session Summary:    Treatment Assessment:   See todays evaluation   Communication/Consultation:   dicussed POC and instructed initial HEP   Equipment provided today:  HEP handout   Recommendations/Intent for next treatment session: Next visit will focus on progressing hip strengthening. Reassess effects of work shift.   Use KT tape if indicated     Total Treatment Billable Duration:  25 minutes  Time In: 0910  Time Out: [facility]       Charge Capture  }Post Session Pain  PT Visit Info  MedBridge Portal  MD Guidelines  Scanned Media  Benefits  MyChart    Future Appointments   Date Time Provider Anita Mark   9/23/2022  9:00 AM Loreda Janet, PT [facility] AND [facility]   9/28/2022 10:30 AM Loreda Janet, PT [facility]   9/30/2022 10:30 AM Loreda Janet, PT [facility]   10/5/2022 10:30 AM Loreda Janet, PT [facility]   10/7/2022 10:30 AM Loreda Janet, PT [facility]   10/12/2022 10:30 AM Loreda Janet, PT [facility]   10/14/2022 10:30 AM Loreda Janet, PT [facility]   10/21/2022 10:30 AM Loreda Janet, PT [facility]   10/26/2022 10:30 AM Loreda Janet, PT [facility]   10/27/2022  9:00 AM Beverlie Sacks, MD [facility]   10/28/2022 10:30 AM Loreda Janet, PT [facility]   2/24/2023  9:30 AM [facility]   3/2/2023  8:40 AM Lorna Burrows MD [facility]

## 2022-09-22 NOTE — PROGRESS NOTES
Vinod Santoro  : 1970  Primary: [place]  Secondary:  [address] @ [facility] [address]  Phone: [phone]  Fax: [phone] Plan Frequency: 2 x per week for 6 weeks  Plan of Care/Certification Expiration Date: 10/31/22      PT Visit Info:  Visit Count:  2   OUTPATIENT PHYSICAL THERAPY:OP NOTE TYPE: Treatment Note 2022       Episode  }Appt Desk             Treatment Diagnosis:  Pain in Left Knee (M25.562)  Other abnormalities of gait and mobility (R26.89)  Medical/Referring Diagnosis:  Left knee pain, unspecified chronicity [M25.562]  Referring Physician:  Amador Arana MD MD Orders:  PT Eval and Treat 2 x 6 weeks   Date of Onset:  Onset Date: 22     Allergies:   Bee pollen and Bee venom  Restrictions/Precautions:  Restrictions/Precautions: None  Required Braces or Orthoses?: No  No data recorded   Interventions Planned (Treatment may consist of any combination of the following):    Current Treatment Recommendations: Strengthening; Balance training; Functional mobility training; Transfer training; Gait training; Stair training; Endurance training; Neuromuscular re-education; Manual Therapy - Joint Manipulation; Pain management; Return to work related activity; Home exercise program; Safety education & training; Modalities; Patient/Caregiver education & training; Therapeutic activities     Subjective Comments:reports she did well during and after workshifts since last visit. Doing HEP regularly not sure if doing all correct. Focusing on symmetrical [place] and movements as discussed in prior treatment. Have not been wearing sleeve. Initial:}    210Post Session:      0  /10  Medications Last Reviewed:  2022  Updated Objective Findings:  See evaluation note from today  Treatment   THERAPEUTIC EXERCISE: (45 minutes):    Exercises per grid below to improve mobility, strength, balance, and coordination.   Required moderate visual, verbal, manual, and tactile cues to promote proper body alignment, promote proper body posture, and promote proper body mechanics. Progressed resistance, range, and repetitions as indicated. Date:  9/19/22 Date:  9/23/22 Date:     Activity/Exercise Parameters Parameters Parameters   Prone hip extension with bent knee  X 10, 5 sec hold  X 10, 5 sec     clamshell X 10 B OTB x 10 B     Shuttle  NV NV    Nustep  NV Iso 44 x 8 min     Prone hip ER/IR X 5      Sit to stand X 3  Touch downs with 10# weight, 2 sets 10     Calf raise  NV NV w/ weight     SLS Assessed  NV    Monster walk   [place] 2 x 80ft     Side stepping   Pink 2 x 80ft     Toe/heel walk   3 x 80ft     Proprioceptive/dynamic balance    NV          Step ups  NV? Gait  150 ft          Treatment/Session Summary:    Treatment Assessment: focused on progressing HEP. Mild increased ache of left knee with toe walk only. Pt appropriately challenged with all above and demonstrated good understanding for HEP. Communication/Consultation:   dicussed POC and instructed progression HEP   Equipment provided today:  HEP handout   Recommendations/Intent for next treatment session: Next visit will focus on progressing hip strengthening. Reassess effects of work shift.   Use KT tape if indicated     Total Treatment Billable Duration:  45 minutes  Time In: 9647  Time Out: 0945    Demi Luis, PT       Charge Capture  }Post Session Pain  PT Visit Info  [facility] Portal  MD Guidelines  Scanned Media  Benefits  MyChart    Future Appointments   Date Time Provider Anita Mrak   9/28/2022 10:30 AM Lucho Chaudhari, PT [facility]   9/30/2022 10:30 AM Luchoparker hCaudhari, PT [facility] AND [facility]   10/5/2022 10:30 AM Lucho Chaudhari, PT [facility]   10/7/2022 10:30 AM Lucho Chaudhari, PT [facility]   10/12/2022 10:30 AM Lucho Chaudhari, PT [facility]   10/14/2022 10:30 AM Lucho Chaudhari, PT [facility]   10/21/2022 10:30 AM Lucho Chaudhari, PT [facility] 10/26/2022 10:30 AM Catarina Smoker, PT [facility]   10/27/2022  9:00 AM MD VALENCIA Leslie [facility]   10/28/2022 10:30 AM Catarina Smoker, PT [facility]   2/24/2023  9:30 AM [facility]   3/2/2023  8:40 AM Christian Phan MD [facility]

## 2022-09-23 ENCOUNTER — HOSPITAL ENCOUNTER (OUTPATIENT)
Dept: PHYSICAL THERAPY | Age: 52
Setting detail: RECURRING SERIES
Discharge: HOME | End: 2022-09-26
Payer: COMMERCIAL

## 2022-09-23 PROCEDURE — 97110 THERAPEUTIC EXERCISES: CPT

## 2022-09-23 ASSESSMENT — PAIN SCALES - GENERAL: PAINLEVEL_OUTOF10: 2

## 2022-09-28 ENCOUNTER — HOSPITAL ENCOUNTER (OUTPATIENT)
Dept: PHYSICAL THERAPY | Age: 52
Setting detail: RECURRING SERIES
Discharge: HOME | End: 2022-10-01
Payer: COMMERCIAL

## 2022-09-28 PROCEDURE — 97110 THERAPEUTIC EXERCISES: CPT

## 2022-09-28 NOTE — PROGRESS NOTES
Roberto Ernandez  : 1970  Primary: [place]  Secondary:  [address] @ [address] [address] [address]  Phone: [phone]  Fax: [phone] Plan Frequency: 2 x per week for 6 weeks  Plan of Care/Certification Expiration Date: 10/31/22      PT Visit Info:  Visit Count:  3   OUTPATIENT PHYSICAL THERAPY:OP NOTE TYPE: Treatment Note 2022       Episode  }Appt Desk             Treatment Diagnosis:  Pain in Left Knee (M25.562)  Other abnormalities of gait and mobility (R26.89)  Medical/Referring Diagnosis:  Left knee pain, unspecified chronicity [M25.562]  Referring Physician:  Carlos Anderson MD MD Orders:  PT Eval and Treat 2 x 6 weeks   Date of Onset:  Onset Date: 22     Allergies:   Bee pollen and Bee venom  Restrictions/Precautions:  Restrictions/Precautions: None  Required Braces or Orthoses?: No    Interventions Planned (Treatment may consist of any combination of the following):    Current Treatment Recommendations: Strengthening; Balance training; Functional mobility training; Transfer training; Gait training; Stair training; Endurance training; Neuromuscular re-education; Manual Therapy - Joint Manipulation; Pain management; Return to work related activity; Home exercise program; Safety education & training; Modalities; Patient/Caregiver education & training; Therapeutic activities     Subjective Comments:  Pt states that she is doing pretty well regarding knee symptoms and noticing muscle soreness of hip muscles after HEP. Had medical procedure 2 days ago that kept her from some HEP but able to resume now. Initial:}    0 /10Post Session:      0  /10  Medications Last Reviewed:  2022  Updated Objective Findings:  See evaluation note from today  Treatment   THERAPEUTIC EXERCISE: (45 minutes):    Exercises per grid below to improve mobility, strength, balance, and coordination.   Required moderate visual, verbal, manual, and tactile cues to promote proper body alignment, promote proper body posture, and promote proper body mechanics. Progressed resistance, range, and repetitions as indicated. Date:  9/19/22 Date:  9/23/22 Date:  9/28/22   Activity/Exercise Parameters Parameters Parameters   Prone hip extension with bent knee  X 10, 5 sec hold  X 10, 5 sec     clamshell X 10 B OTB x 10 B     Shuttle  NV NV    Nustep  NV Iso 44 x 8 min  X 10 min level 6   Prone hip ER/IR X 5      Sit to stand X 3  Touch downs with 10# weight, 2 sets 10     Calf raise  NV NV w/ weight  NV    Step tap  Assessed  NV  Standing on Foam: alternating foot tap onto table   Monster walk   [place] 2 x 80ft  HEP   Side stepping   Pink 2 x 80ft  HEP   Toe/heel walk   3 x 80ft  4 x 80 ft    Agility ladder drills    NV   Proprioceptive/dynamic balance    NV Step over 2x 10 lat and 2 x 10     Ball toss in static mini squat standing on foam x 2 min        Mccall carry    15# 2 x 80ft   TRX NV? DL squat 2 x 10   SL squat 2 x 10 (mini)   Gait  150 ft   150ft level ground        Treatment/Session Summary:    Treatment Assessment: focused on progressing strength and stability work. Pt performed all without c/o's of increased knee pain. Some cueing throughout to engage abdominal muscles during activities. Pt exceeded expectations with SL balance activities and will progress to higher level activities next visit specifically addressing lateral and pivoting type movements. Discussed bosu ideas and sent home with handout of instructions on appropriate exercises for home since pt owns bosu. Communication/Consultation:   dicussed POC and instructed progression HEP   Equipment provided today:  HEP handout   Recommendations/Intent for next treatment session: Next visit will focus on progressing hip strengthening.   Agility/quick reaction activities     Total Treatment Billable Duration:  45 minutes  Time In: 1030  Time Out: 1115    PHILIPP MONTANO, PT       Charge Capture }Post Session Pain  PT Visit Info  MedBridge Portal  MD Guidelines  Scanned Media  Benefits  MyChart    Future Appointments   Date Time Provider Anita Mark   9/30/2022 10:30 AM Halle Gil, PT [facility]   10/5/2022 10:30 AM Halle Gil, PT [facility]   10/7/2022 10:30 AM Halle Gil, PT [facility]   10/12/2022 10:30 AM Halle Gil, PT [facility]   10/14/2022 10:30 AM Halle Gil, PT [facility]   10/21/2022 10:30 AM Halle Gil, PT [facility]   10/26/2022 10:30 AM Halle Gil, PT [facility]   10/28/2022 10:30 AM Halle Gil, PT [facility]   10/31/2022  1:00 PM MD VALENCIA Chang [facility]   2/24/2023  9:30 AM [facility]   3/2/2023  8:40 AM Jose Simmons MD [facility]

## 2022-09-30 ENCOUNTER — HOSPITAL ENCOUNTER (OUTPATIENT)
Dept: PHYSICAL THERAPY | Age: 52
Setting detail: RECURRING SERIES
Discharge: HOME | End: 2022-10-03
Payer: COMMERCIAL

## 2022-09-30 PROCEDURE — 97140 MANUAL THERAPY 1/> REGIONS: CPT

## 2022-09-30 PROCEDURE — 97110 THERAPEUTIC EXERCISES: CPT

## 2022-09-30 NOTE — PROGRESS NOTES
Monica Mcguire  : 1970  Primary: Connie Nurse  Secondary:  [address] @ [address] [address] [address]  Phone: [phone]  Fax: [phone] Plan Frequency: 2 x per week for 6 weeks  Plan of Care/Certification Expiration Date: 10/31/22      PT Visit Info:  Visit Count:  4   OUTPATIENT PHYSICAL THERAPY:OP NOTE TYPE: Treatment Note 2022       Episode  }Appt Desk             Treatment Diagnosis:  Pain in Left Knee (M25.562)  Other abnormalities of gait and mobility (R26.89)  Medical/Referring Diagnosis:  Left knee pain, unspecified chronicity [M25.562]  Referring Physician:  Hina Gama MD MD Orders:  PT Eval and Treat 2 x 6 weeks   Date of Onset:  Onset Date: 22     Allergies:   Bee pollen and Bee venom  Restrictions/Precautions:  Restrictions/Precautions: None  Required Braces or Orthoses?: No    Interventions Planned (Treatment may consist of any combination of the following):    Current Treatment Recommendations: Strengthening; Balance training; Functional mobility training; Transfer training; Gait training; Stair training; Endurance training; Neuromuscular re-education; Manual Therapy - Joint Manipulation; Pain management; Return to work related activity; Home exercise program; Safety education & training; Modalities; Patient/Caregiver education & training; Therapeutic activities     Subjective Comments: pain of posterior medial knee today \"it was a tough night at work last night and knee\"           Initial:}    0 /10Post Session:      0  /10  Medications Last Reviewed:  2022  Updated Objective Findings:  See evaluation note from today  Treatment   THERAPEUTIC EXERCISE: (25 minutes):    Exercises per grid below to improve mobility, strength, balance, and coordination. Required moderate visual, verbal, manual, and tactile cues to promote proper body alignment, promote proper body posture, and promote proper body mechanics. Progressed resistance, range, and repetitions as indicated. Date:  9/19/22 Date:  9/23/22 Date:  9/28/22 9/30/22   Activity/Exercise Parameters Parameters Parameters    Prone hip extension with bent knee  X 10, 5 sec hold  X 10, 5 sec      clamshell X 10 B OTB x 10 B      Shuttle  NV NV  50# 2 x 10 SL   Foam 50#    Nustep  NV Iso 44 x 8 min  X 10 min level 6 X 10 min iso 40 rpms   Prone hip ER/IR X 5       Sit to stand X 3  Touch downs with 10# weight, 2 sets 10      Calf raise  NV NV w/ weight  NV  2--10# plates    2 x [id number]     Step tap  Assessed  NV  Standing on Foam: alternating foot tap onto table    Monster walk   [place] 2 x 80ft  HEP    Side stepping   Pink 2 x 80ft  HEP    Toe/heel walk   3 x 80ft  4 x 80 ft     Agility ladder drills    NV    Proprioceptive/dynamic balance    NV Step over 2x 10 lat and 2 x 10     Ball toss in static mini squat standing on foam x 2 min         Mccall carry    15# 2 x 80ft    TRX NV? DL squat 2 x 10   SL squat 2 x 10 (mini)    Gait  150 ft   150ft level ground       Manual:  15 min   Trigger point release with intermittent MET to inhibit right posterior -medial hamstring and gastroc tendons     Modalities: x 10 min ice application posteriror knee post treatment to decrease inflammation: pt prone   Treatment/Session Summary:    Treatment Assessment:  decreased tolerance to exercise today due to right post knee pain. Manual techniques successful to reduce trigger point of medial prox gastroc / distal hamstring tendon. Ice application post Pt with decreased pain upon departure  Communication/Consultation:   dicussed POC and instructed progression HEP   Equipment provided today:  HEP handout   Recommendations/Intent for next treatment session: Next visit will focus on progressing hip strengthening.   Agility/quick reaction activities     Total Treatment Billable Duration:  40 minutes + ice application   Time In: 8740  Time Out: [address] DOMINIQUE MONTANO       Charge Capture }Post Session Pain  PT Visit Info  MedBridge Portal  MD Guidelines  Scanned Media  Benefits  MyChart    Future Appointments   Date Time Provider Anita Deedee   10/5/2022 10:30 AM Halle Gil, PT [facility]   10/7/2022 10:30 AM Halle Gil, PT [facility]   10/12/2022 10:30 AM Halle Gil, PT [facility]   10/14/2022 10:30 AM Halle Gil, PT [facility]   10/21/2022 10:30 AM Halle Gil, PT [facility]   10/26/2022 10:30 AM Halle Gil, PT [facility]   10/28/2022 10:30 AM Halle Gil, PT [facility]   10/31/2022  1:00 PM MD VALENCIA Chang [facility]   2/24/2023  9:30 AM [facility]   3/2/2023  8:40 AM Jose Simmons MD [facility]

## 2022-10-05 ENCOUNTER — APPOINTMENT (OUTPATIENT)
Dept: PHYSICAL THERAPY | Age: 52
End: 2022-10-05
Payer: COMMERCIAL

## 2022-10-06 ENCOUNTER — HOSPITAL ENCOUNTER (OUTPATIENT)
Dept: PHYSICAL THERAPY | Age: 52
Setting detail: RECURRING SERIES
End: 2022-10-06
Payer: COMMERCIAL

## 2022-10-06 NOTE — PROGRESS NOTES
Mukund Watts  : 1970  Primary: [place] Jimenez[place]  Secondary:  [address] @ [address] [facility] [address]  Phone: [phone]  Fax: [phone] Plan Frequency: 2 x per week for 6 weeks    Plan of Care/Certification Expiration Date: 10/31/22         OUTPATIENT PHYSICAL THERAPY 10/6/2022     Appt Desk   Episode   MyChart      Ms. Krista Conte cancelled todays appointment due to a  and unable to reschedule due to work schedule. Will follow up next week.      James Ross, PT    Future Appointments   Date Time Provider Anita Reyesi   10/6/2022  7:00 PM Ankit Resendiz PT [facility] AND [facility]   10/12/2022 10:30 AM [facility], [facility]   10/26/2022 10:30 AM Ankit Resendiz PT [facility]   10/28/2022 10:30 AM Ankit Resendiz, PT [facility]   10/31/2022  1:00 PM MD VALENCIA Freeman [facility]   2023  9:30 AM [facility]   3/2/2023  8:40 AM West Barrera MD [facility]

## 2022-10-07 ENCOUNTER — APPOINTMENT (OUTPATIENT)
Dept: PHYSICAL THERAPY | Age: 52
End: 2022-10-07
Payer: COMMERCIAL

## 2022-10-12 ENCOUNTER — HOSPITAL ENCOUNTER (OUTPATIENT)
Dept: PHYSICAL THERAPY | Age: 52
Setting detail: RECURRING SERIES
Discharge: HOME | End: 2022-10-15
Payer: COMMERCIAL

## 2022-10-12 PROCEDURE — 97110 THERAPEUTIC EXERCISES: CPT

## 2022-10-12 NOTE — PROGRESS NOTES
Roberto Ernandez  : 1970  Primary: [place]  Secondary:  [address] @ [address] [address] [facility] [address]  Phone: [phone]  Fax: [phone] Plan Frequency: 2 x per week for 6 weeks  Plan of Care/Certification Expiration Date: 10/31/22      PT Visit Info:  Visit Count:  5   OUTPATIENT PHYSICAL THERAPY:OP NOTE TYPE: Treatment Note 10/12/2022       Episode  }Appt Desk             Treatment Diagnosis:  Pain in Left Knee (M25.562)  Other abnormalities of gait and mobility (R26.89)  Medical/Referring Diagnosis:  Left knee pain, unspecified chronicity [M25.562]  Referring Physician:  Carlos Anderson MD MD Orders:  PT Eval and Treat 2 x 6 weeks   Date of Onset:  Onset Date: 22     Allergies:   Bee pollen and Bee venom  Restrictions/Precautions:  Restrictions/Precautions: None  Required Braces or Orthoses?: No    Interventions Planned (Treatment may consist of any combination of the following):    Current Treatment Recommendations: Strengthening; Balance training; Functional mobility training; Transfer training; Gait training; Stair training; Endurance training; Neuromuscular re-education; Manual Therapy - Joint Manipulation; Pain management; Return to work related activity; Home exercise program; Safety education & training; Modalities; Patient/Caregiver education & training; Therapeutic activities     Subjective Comments: pt states she is having trouble keeping up HEP due to work hours but usually feels better after doing exercises. Still tight and sore of posterior knee after work shift. Initial:}    0 /10Post Session:      0  /10  Medications Last Reviewed:  10/12/2022  Updated Objective Findings:  See evaluation note from today  Treatment   THERAPEUTIC EXERCISE: (45 minutes):    Exercises per grid below to improve mobility, strength, balance, and coordination.   Required moderate visual, verbal, manual, and tactile cues to promote proper body alignment, promote proper body posture, and promote proper body mechanics. Progressed resistance, range, and repetitions as indicated. Date:  9/19/22 Date:  9/23/22 Date:  9/28/22 9/30/22 10/12/22   Activity/Exercise Parameters Parameters Parameters     Prone hip extension with bent knee  X 10, 5 sec hold  X 10, 5 sec    Over table x 10    clamshell X 10 B OTB x 10 B       Shuttle  NV NV  50# 2 x 10 SL   Foam 50#     Nustep  NV Iso 44 x 8 min  X 10 min level 6 X 10 min iso 40 rpms X 10 min 45rpm    Prone hip ER/IR X 5        Sit to stand X 3  Touch downs with 10# weight, 2 sets 10    Touch downs with  20# 2 x 10    Calf raise  NV NV w/ weight  NV  2--10# plates    2 x [id number]   20# Dbs 2 x 15    Step tap  Assessed  NV  Standing on Foam: alternating foot tap onto table     Monster walk   [place] 2 x 80ft  HEP  OTB 2 x 80 ft    Side stepping   Pink 2 x 80ft  HEP  OTB around feet 2 x 80 ft    Toe/heel walk   3 x 80ft  4 x 80 ft      Agility ladder drills    NV     Proprioceptive/dynamic balance    NV Step over 2x 10 lat and 2 x 10     Ball toss in static mini squat standing on foam x 2 min       T-band \"steam boat\" (next visit?)   Christianne Pardo carry    15# 2 x 80ft  2-20# 2 min     Sled      Next visit? TRX NV? DL squat 2 x 10   SL squat 2 x 10 (mini)     Gait  150 ft   150ft level ground        Manual:       Modalities:   Treatment/Session Summary:    Treatment Assessment:  all above performed well. Able to progress resistance and reps in all activities without report of knee pain. Focusing on mimicking exercises to work tasks such as pushing, pulling and carrying heavy objects mulitdirectional, to condition for work shifts without strain on knee. Communication/Consultation:   dicussed POC and instructed progression HEP   Equipment provided today:  HEP handout   Recommendations/Intent for next treatment session: Next visit will focus on progressing hip strengthening.   Agility/quick reaction activities     Total Treatment Billable Duration:  45 min   Time In: 1030  Time Out: [address] [facility] DUSTY, PT       Charge Capture  }Post Session Pain  PT Visit Info  MedBridge Portal  MD Guidelines  Scanned Media  Benefits  MyChart    Future Appointments   Date Time Provider Anita Deedee   10/21/2022  9:45 AM Gianluca Coughlin, PT [facility] AND [facility]   10/26/2022 10:30 AM Crescencio Abel, PT [facility] [place] [facility]   10/28/2022 10:30 AM Crescencio Abel, PT [facility]   10/31/2022  1:00 PM Shirley Valero MD [facility]   2/24/2023  9:30 AM [facility]   3/2/2023  8:40 AM Layo Guzman MD [facility]

## 2022-10-14 ENCOUNTER — APPOINTMENT (OUTPATIENT)
Dept: PHYSICAL THERAPY | Age: 52
End: 2022-10-14
Payer: COMMERCIAL

## 2022-10-20 ENCOUNTER — TRANSCRIBE ORDERS (OUTPATIENT)
Dept: SCHEDULING | Age: 52
End: 2022-10-20

## 2022-10-20 DIAGNOSIS — Z12.31 VISIT FOR SCREENING MAMMOGRAM: Primary | ICD-10-CM

## 2022-10-21 ENCOUNTER — APPOINTMENT (OUTPATIENT)
Dept: PHYSICAL THERAPY | Age: 52
End: 2022-10-21
Payer: COMMERCIAL

## 2022-10-26 ENCOUNTER — HOSPITAL ENCOUNTER (OUTPATIENT)
Dept: PHYSICAL THERAPY | Age: 52
Setting detail: RECURRING SERIES
Discharge: HOME | End: 2022-10-29
Payer: COMMERCIAL

## 2022-10-26 PROCEDURE — 97110 THERAPEUTIC EXERCISES: CPT

## 2022-10-26 NOTE — THERAPY RECERTIFICATION
Zac Chaparro  : 1970  Primary: Ruthie Santiago  Secondary:  [address] [facility] [address]  Phone: [phone]  Fax: [phone] Plan Frequency: 2 x per week for 6 weeks  Plan of Care/Certification Expiration Date: 22      PT Visit Info:    Visit Count:  6    OUTPATIENT PHYSICAL THERAPY:OP NOTE TYPE: Re certification -extension of POC 10/26/2022               Episode  Appt Desk         Treatment Diagnosis:  Pain in Left Knee (M25.562)  Other abnormalities of gait and mobility (R26.89)  * No diagnoses found *  Medical/Referring Diagnosis:  Left knee pain, unspecified chronicity [M25.562]  Referring Physician:  Yara Mast MD MD Orders:  PT Eval and Treat 2-3 / wk x 6 weeks   Return MD Appt: 2022  Date of Onset:  Onset Date: 22     Allergies:  Bee pollen and Bee venom  Restrictions/Precautions:    Restrictions/Precautions: None  Required Braces or Orthoses?: No     Medications Last Reviewed:  10/26/2022     SUBJECTIVE    Pt states knee feels stable and min to no pain with most activities.     OBJECTIVE     Observation/Orthostatic Postural Assessment:    Standing: mild genu varus B  Sitting: normal alignment/posture     Palpation:    No palpable tenderness or edema of left knee or lower leg     AROM B LE WNL      Strength:      Eval Date: 22   Re-Assess Date:     RIGHT LEFT LEFT 10/26/22   Knee Flexion  5/5  5/5 5/5   Knee Extension  5/5  5/5 5/5    Hip Flexion  5/5  5/5 5/5    Hip Abduction  5/5  4+/5 5/5    Hip Extension  5/5  4+/5 5/5    Ankle Dorsiflexion    5/5 5/5 5/5        Muscle length:   WNL of hip, knee and ankle B      Neurological Screen:   No subjective c/o's of sensation or weakness     Functional Mobility:   Actvity Comments/Parameters    Gait/Ambulation Symmetrical w/o abnormalities, no left knee pain on all surfaces    Transfers  Symmetrical/ independent    Stairs No left knee pain; recip gait; some decreased eccentric control of B quads   Squat  Equal B SL, good alignment pelvis-hip-knee   SLS 30 + sec B    30 sec sit to stand  15 reps          ASSESSMENT   Assessment:   Alli Haile progressing very well. Showing improved hip strength, decrease reports of knee pain during work shifts, no pain with daily activities, and improved balance. Pt subjectively reports knee much more stable feeling. On track with all therapy goals. She was unable to attend 2 sessions last week due to [facility] duty and recommend extension of POC to continue strength progression for progressive HEP post discharge. Problem List: (Impacting functional limitations): Body Structures, Functions, Activity Limitations Requiring Skilled Therapeutic Intervention: Decreased ADL status; Decreased functional mobility ; Decreased body mechanics; Decreased tolerance to work activity; Decreased strength; Decreased endurance; Decreased balance; Decreased coordination; Increased pain; Decreased posture     Therapy Prognosis:   Therapy Prognosis: Good       PLAN   Effective Dates: 9/19/22 TO Plan of Care/Certification Expiration Date: 11/11/22     Frequency/Duration: Plan Frequency: 2 x per week for 6 weeks     Interventions Planned (Treatment may consist of any combination of the following):    Current Treatment Recommendations: Strengthening; Balance training; Functional mobility training; Transfer training; Gait training; Stair training; Endurance training; Neuromuscular re-education; Manual Therapy - Joint Manipulation; Pain management; Return to work related activity; Home exercise program; Safety education & training; Modalities; Patient/Caregiver education & training; Therapeutic activities     GOALS: (Goals have been discussed and agreed upon with patient.)    Short-Term Goals 4 weeks Met Ongoing Not Met   Alli Haile will be instructed on an initial HEP focusing on pain management, strength and safety.    [x] []   []    Marko Tolentino will demonstrate SLS x > 10 sec L with good hip and knee alignment to improve functional hip and knee stability during activities. [x]   []   []    Marko Tolentino will report pain level over 1-2 week period of time not exceeding 4/10. [x]   []   []          Long Term Goals 6 weeks      Marko Tolentino will demonstrate SL squat without compensatory movements of left knee or hip to demonstrate improved functional strength and coordination of hip and knee protecting knee during ADLs and recreational/work activities   [x]   []   []    Marko Tolentino will demonstrate 5/5 left hip MMT all planes knee to improve ability to perform work related and daily tasks with minimal stress upon knee. [x]   []   []    Marko Tolentino will demonstrate improved LEFS score by 15 points or greater to show decreased functional limitations during daily activities. [x]   []   []    Marko Tolentino will complete > 14 sit to stand repetitions in 30 seconds to demonstrate improved functional mobility and safety during daily activities. [x]   []   []      Marko Tolentino be able to ascend and descend 14 stairs with reciprocal gait pattern and min or no use of UE for support safely to improve functional mobility during home and community ambulation. []   [x]   []                    Outcome Measure: Tool Used: Lower Extremity Functional Scale (LEFS)  Score:  Initial: 53/80 Most Recent: 70/80 (Date: 10/26/22- )   Interpretation of Score: 20 questions each scored on a 5 point scale with 0 representing \"extreme difficulty or unable to perform\" and 4 representing \"no difficulty\". The lower the score, the greater the functional disability. 80/80 represents no disability. Minimal detectable change is 9 points. Medical Necessity:   Skilled intervention continues to be required due to current impairment.   Reason For Services/Other

## 2022-10-26 NOTE — PROGRESS NOTES
Sourav Covina  : 1970  Primary: Renae Husbands  Secondary:  [address] [address]  Phone: [phone]  Fax: [phone] Plan Frequency: 2 x per week for 6 weeks  Plan of Care/Certification Expiration Date: 10/31/22      PT Visit Info:  Visit Count:  6   OUTPATIENT PHYSICAL THERAPY:OP NOTE TYPE: Treatment Note 10/26/2022       Episode  }Appt Desk             Treatment Diagnosis:  Pain in Left Knee (M25.562)  Other abnormalities of gait and mobility (R26.89)  Medical/Referring Diagnosis:  Left knee pain, unspecified chronicity [M25.562]  Referring Physician:  Desmond Yu MD MD Orders:  PT Eval and Treat 2 x 6 weeks   Date of Onset:  Onset Date: 22     Allergies:   Bee pollen and Bee venom  Restrictions/Precautions:  Restrictions/Precautions: None  Required Braces or Orthoses?: No    Interventions Planned (Treatment may consist of any combination of the following):    Current Treatment Recommendations: Strengthening; Balance training; Functional mobility training; Transfer training; Gait training; Stair training; Endurance training; Neuromuscular re-education; Manual Therapy - Joint Manipulation; Pain management; Return to work related activity; Home exercise program; Safety education & training; Modalities; Patient/Caregiver education & training; Therapeutic activities     Subjective Comments: pt reports that she has been doing very well and had no knee pain with prolonged activity over weekend (walking prolonged and raking leaves)           To see MD next week, has 2 more PT sessions scheduled      Initial:}    0 10Post Session:      0  10  Medications Last Reviewed:  10/26/2022  Updated Objective Findings:  See evaluation note from today  Treatment   THERAPEUTIC EXERCISE: (45 minutes):    Exercises per grid below to improve mobility, strength, balance, and coordination.   Required moderate visual, verbal, manual, and tactile cues to promote proper body alignment, promote proper body posture, and promote proper body mechanics. Progressed resistance, range, and repetitions as indicated. Date:  9/19/22 Date:  9/23/22 Date:  9/28/22 9/30/22 10/12/22 10/26/22   Activity/Exercise Parameters Parameters Parameters      Prone hip extension with bent knee  X 10, 5 sec hold  X 10, 5 sec    Over table x 10     clamshell X 10 B OTB x 10 B        Shuttle  NV NV  50# 2 x 10 SL   Foam 50#   nv   Nustep  NV Iso 44 x 8 min  X 10 min level 6 X 10 min iso 40 rpms X 10 min 45rpm  X 10 min level 7   Prone hip ER/IR X 5         Sit to stand X 3  Touch downs with 10# weight, 2 sets 10    Touch downs with  20# 2 x 10  X 15    Calf raise  NV NV w/ weight  NV  2--10# plates    2 x [id number]   20# Dbs 2 x 15     Step tap  Assessed  NV  Standing on Foam: alternating foot tap onto table      Monster walk   [place] 2 x 80ft  HEP  OTB 2 x 80 ft  OTB 2 x 80 ft    Side stepping   Pink 2 x 80ft  HEP  OTB around feet 2 x 80 ft  OTB at feet  3 x 40 ft    Toe/heel walk   3 x 80ft  4 x 80 ft       Agility ladder drills    NV   Weaving and lateral quick steps   Proprioceptive/dynamic balance    NV Step over 2x 10 lat and 2 x 10     Ball toss in static mini squat standing on foam x 2 min       T-band \"steam boat\" (next visit?)    Mccall carry    15# 2 x 80ft  2-20# 2 min      Sled      Next visit? 75# 3 x 80ft    TRX NV? DL squat 2 x 10   SL squat 2 x 10 (mini)      SL balance      35 sec B    Squat       SL x2  for assessment    Stairs       16 stair steps ascend and descend    Gait  150 ft   150ft level ground         Manual:       Modalities:   Treatment/Session Summary:    Treatment Assessment:  reassessment performed today for communication to MD prior to visit. All above performed well. Able to progress resistance and reps in all activities without report of knee pain.  Focusing on mimicking exercises to work tasks such as pushing, pulling and carrying heavy objects mulitdirectional, to condition for work shifts without strain on knee. Communication/Consultation:   dicussed POC  /discharge plan    progress update to MD   Equipment provided today:  HEP handout   Recommendations/Intent for next treatment session: Next visit will focus on progressing hip/knee strengthening.   Agility/quick reaction activities     Total Treatment Billable Duration:  45 min   Time In: 1030  Time Out: 1115    PHILIPP MONTANO, PT       Charge Capture  }Post Session Pain  PT Visit Info  [employer] Portal  MD Guidelines  Scanned Media  Benefits  MyChart    Future Appointments   Date Time Provider Anita Mark   10/31/2022  1:00 PM Aruna Aleman MD [facility]   11/2/2022 11:30 AM [facility]   11/3/2022 10:30 AM Ele Oliveira, PT [facility]   11/7/2022 10:30 AM Ele Oliveira PT [facility]   2/24/2023  9:30 AM [facility]   3/2/2023  8:40 AM Mariama Rodarte MD [facility]

## 2022-10-28 ENCOUNTER — APPOINTMENT (OUTPATIENT)
Dept: PHYSICAL THERAPY | Age: 52
End: 2022-10-28
Payer: COMMERCIAL

## 2022-10-31 ENCOUNTER — OFFICE VISIT (OUTPATIENT)
Dept: ORTHOPEDIC SURGERY | Age: 52
End: 2022-10-31
Payer: COMMERCIAL

## 2022-10-31 DIAGNOSIS — M25.562 CHRONIC PAIN OF LEFT KNEE: Primary | ICD-10-CM

## 2022-10-31 DIAGNOSIS — G89.29 CHRONIC PAIN OF LEFT KNEE: Primary | ICD-10-CM

## 2022-10-31 DIAGNOSIS — M17.12 PRIMARY OSTEOARTHRITIS OF LEFT KNEE: ICD-10-CM

## 2022-10-31 PROCEDURE — 99212 OFFICE O/P EST SF 10 MIN: CPT | Performed by: STUDENT IN AN ORGANIZED HEALTH CARE EDUCATION/TRAINING PROGRAM

## 2022-10-31 NOTE — PROGRESS NOTES
Name: Lv Batista  YOB: 1970  Gender: female  MRN: [id number]  Date of Encounter:  10/31/2022       CHIEF COMPLAINT:     Chief Complaint   Patient presents with    Follow-up     6 week f/u - left knee        SUBJECTIVE/OBJECTIVE:      HPI:    Patient is a 50 y.o. pleasant female who presents today for a follow up evaluation of her left knee. LOV: 9/15/2022     She reports improved knee pain as well as strength. She has been to a few therapy visits, and feels like this is helping, however PT does make her sore. She feels like she is trusting her anymore. She wants to continue with therapy and has had some issues being consistent with her home exercise program, especially because she works second shift. She denies any significant swelling about the knee. She takes oral Toradol occasionally and ibuprofen, but not every day. No other complaints today. PAST HISTORY:   Past medical, surgical, family, social history and allergies reviewed by me. Unchanged from prior visit. REVIEW OF SYSTEMS:   As noted in HPI. PHYSICAL EXAMINATION:     Gen: Well-developed, no acute distress   HEENT: NC/AT, EOMI   Neck: Trachea midline, normal ROM   CV: Regular rhythm by palpation of distal pulse, normal capillary refill   Pulm: No respiratory distress, no stridor   Psychiatric: Well oriented, normal mood and affect. Skin: No rashes, lesions or ulcers, normal temperature, turgor, and texture on uninvolved extremity. ORTHO EXAM:     Left knee:     Alignment: normal  Inspection: No deformity, No edema, No ecchymosis  Palpation: Effusion  none; Crepitus Negative, Patellar mobility normal  ROM: 140 flexion, 0 extension   Tenderness: Medial joint line  Provocative testing: (-) Mary lateral, Mary medial  Strength: Extensor mechanism intact  Sensation: intact to light touch   Capillary refill normal    Gait: Normal      DIAGNOSTIC IMAGING:     I have reviewed prior imaging studies.

## 2022-11-02 ENCOUNTER — HOSPITAL ENCOUNTER (OUTPATIENT)
Dept: MAMMOGRAPHY | Age: 52
Discharge: HOME | End: 2022-11-05
Payer: COMMERCIAL

## 2022-11-02 DIAGNOSIS — Z12.31 VISIT FOR SCREENING MAMMOGRAM: ICD-10-CM

## 2022-11-02 PROCEDURE — 77067 SCR MAMMO BI INCL CAD: CPT

## 2022-11-07 ENCOUNTER — HOSPITAL ENCOUNTER (OUTPATIENT)
Dept: PHYSICAL THERAPY | Age: 52
Setting detail: RECURRING SERIES
Discharge: HOME | End: 2022-11-10
Payer: COMMERCIAL

## 2022-11-07 PROCEDURE — 97140 MANUAL THERAPY 1/> REGIONS: CPT

## 2022-11-07 PROCEDURE — 97110 THERAPEUTIC EXERCISES: CPT

## 2022-11-07 NOTE — PROGRESS NOTES
Antionette Benitez  : 1970  Primary: Robin Arora  Secondary:  [address] @ [address] [place] [address]  Phone: [phone]  Fax: [phone] Plan Frequency: 2 x per week for 6 weeks  Plan of Care/Certification Expiration Date: 22      PT Visit Info:  Visit Count:  7   OUTPATIENT PHYSICAL THERAPY:OP NOTE TYPE: Treatment Note 2022       Episode  }Appt Desk             Treatment Diagnosis:  Pain in Left Knee (M25.562)  Other abnormalities of gait and mobility (R26.89)  Medical/Referring Diagnosis:  Left knee pain, unspecified chronicity [M25.562]  Referring Physician:  Milly Howard MD MD Orders:  PT Eval and Treat 2 x 6 weeks   Date of Onset:  Onset Date: 22     Allergies:   Bee pollen and Bee venom  Restrictions/Precautions:  Restrictions/Precautions: None  Required Braces or Orthoses?: No    Interventions Planned (Treatment may consist of any combination of the following):    Current Treatment Recommendations: Strengthening; Balance training; Functional mobility training; Transfer training; Gait training; Stair training; Endurance training; Neuromuscular re-education; Manual Therapy - Joint Manipulation; Pain management; Return to work related activity; Home exercise program; Safety education & training; Modalities; Patient/Caregiver education & training; Therapeutic activities     Subjective Comments: pt states that she helped take down a tree and was carrying heavy loads and squatting and since then (3 days ago) knee has been aching. Saw MD prior to that and states she can finish out therapy and then if needed injection. Initial:}    0 /10Post Session:      0  10  Medications Last Reviewed:  2022  Updated Objective Findings:  pain of joint with end range left knee  flex  Treatment   THERAPEUTIC EXERCISE: (20 minutes):    Exercises per grid below to improve mobility, strength, balance, and coordination.   Required moderate visual, verbal, manual, and tactile cues to promote proper body alignment, promote proper body posture, and promote proper body mechanics. Progressed resistance, range, and repetitions as indicated. Date:  9/19/22 Date:  9/23/22 Date:  9/28/22 9/30/22 10/12/22 10/26/22 11/7/22   Activity/Exercise Parameters Parameters Parameters       Prone hip extension with bent knee  X 10, 5 sec hold  X 10, 5 sec    Over table x 10      clamshell X 10 B OTB x 10 B         Shuttle  NV NV  50# 2 x 10 SL   Foam 50#   nv    Nustep  NV Iso 44 x 8 min  X 10 min level 6 X 10 min iso 40 rpms X 10 min 45rpm  X 10 min level 7 X 10 min iso 50 rpm    Prone hip ER/IR X 5          Sit to stand X 3  Touch downs with 10# weight, 2 sets 10    Touch downs with  20# 2 x 10  X 15     Calf raise  NV NV w/ weight  NV  2--10# plates    2 x [id number]   20# Dbs 2 x 15      Step tap  Assessed  NV  Standing on Foam: alternating foot tap onto table       Monster walk   [place] 2 x 80ft  HEP  OTB 2 x 80 ft  OTB 2 x 80 ft     Side stepping   Pink 2 x 80ft  HEP  OTB around feet 2 x 80 ft  OTB at feet  3 x 40 ft     Toe/heel walk   3 x 80ft  4 x 80 ft        Agility ladder drills    NV   Weaving and lateral quick steps    Proprioceptive/dynamic balance    NV Step over 2x 10 lat and 2 x 10     Ball toss in static mini squat standing on foam x 2 min       T-band \"steam boat\" (next visit?) 35 sec B  SL:      On BF 3 x 20 sec SL left, cone tap right    Farmer carry    15# 2 x 80ft  2-20# 2 min       Sled      Next visit? 75# 3 x 80ft     TRX NV?   DL squat 2 x 10   SL squat 2 x 10 (mini)       SL balance          Squat       SL x2  for assessment     Stool scoot        5 x 80 ft    Stairs       16 stair steps ascend and descend     Gait  150 ft   150ft level ground          Manual: (25 min) to improve joint mechanics and tissue mobility left knee   Friction massage to lateral left knee  AP/PA and inf/sup mob to left prox tib fib, 3 bouts each   Grade III knee flexion mob 3 bouts   KT tape applied for patellar tracking and prox tib fib support     Modalities:   Treatment/Session Summary:    Treatment Assessment: improved tib fib joint mobility and tissue mobility of lateral left knee post manual techniques. KT applied to support normal joint mechanics during work shift. Will request extension of POC dates due to pain exacerbation recently and missed appointments due to work schedule. Communication/Consultation: request to extend POC dates sent to MD   Equipment provided today:    Recommendations/Intent for next treatment session: Next visit will focus on progressing hip/knee strengthening.   Agility/quick reaction activities     Total Treatment Billable Duration:  45 min   Time In: 1030  Time Out: 1115    PHILIPP MONTANO, PT       Charge Capture  }Post Session Pain  PT Visit Info  [employer] Portal  MD Guidelines  Scanned Media  Benefits  MyChart    Future Appointments   Date Time Provider Anita Mark   11/14/2022 10:30 AM Randy Milks, PT [facility]   11/21/2022  9:45 AM Randy Milks, PT [facility]   11/28/2022 10:30 AM Randy Milks, PT [facility]   1/31/2023 10:00 AM Roberto Guidry MD [facility]   2/24/2023  9:30 AM [facility]   3/2/2023  8:40 AM Radha Hernandez MD [facility]

## 2022-11-07 NOTE — THERAPY RECERTIFICATION
Marko Tolentino  : 1970  Primary: Eun Saldivar  Secondary:  [address] @ [address] [facility] [address]  Phone: [phone]  Fax: [phone] Plan Frequency: 2 x per week for 6 weeks  Plan of Care/Certification Expiration Date: 22      PT Visit Info:    Visit Count:  7    OUTPATIENT PHYSICAL THERAPY:OP NOTE TYPE: Re certification -extension of POC dates 2022               Episode  Appt Desk         Treatment Diagnosis:  Pain in Left Knee (M25.562)  Other abnormalities of gait and mobility (R26.89)  * No diagnoses found *  Medical/Referring Diagnosis:  Left knee pain, unspecified chronicity [M25.562]  Referring Physician:  Gus Felipe MD MD Orders:  PT Eval and Treat 2-3 / wk x 6 weeks   Return MD Appt: 2022  Date of Onset:  Onset Date: 22     Allergies:  Bee pollen and Bee venom  Restrictions/Precautions:    Restrictions/Precautions: None  Required Braces or Orthoses?: No     Medications Last Reviewed:  2022     SUBJECTIVE    22- increased pain of left knee over last week. Taking tramadol and using ice to relieve. Avoiding some HEP temporarily.     OBJECTIVE  10/26/22     Observation/Orthostatic Postural Assessment:    Standing: mild genu varus B  Sitting: normal alignment/posture     Palpation:    No palpable tenderness or edema of left knee or lower leg     AROM B LE WNL      Strength:      Eval Date: 22   Re-Assess Date:     RIGHT LEFT LEFT 10/26/22   Knee Flexion  5/5  5/5 5/5   Knee Extension  5/5  5/5 5/5    Hip Flexion  5/5  5/5 5/5    Hip Abduction  5/5  4+/5 5/5    Hip Extension  5/5  4+/5 5/5    Ankle Dorsiflexion    5/5 5/5 5/5        Muscle length:   WNL of hip, knee and ankle B      Neurological Screen:   No subjective c/o's of sensation or weakness     Functional Mobility:   Actvity Comments/Parameters    Gait/Ambulation Symmetrical w/o abnormalities, no left knee pain on all surfaces Transfers  Symmetrical/ independent    Stairs No left knee pain; recip gait; some decreased eccentric control of B quads   Squat  Equal B SL, good alignment pelvis-hip-knee   SLS 30 + sec B    30 sec sit to stand  15 reps          ASSESSMENT   Assessment:   Jessica Escobedo progressing very well. Showing improved hip strength, decrease reports of knee pain during work shifts, no pain with daily activities, and improved balance. Pt subjectively reports knee much more stable feeling. On track with all therapy goals. She was unable to attend 2 sessions last week due to [facility] duty and recommend extension of POC to continue strength progression for progressive HEP post discharge. 11/7/22: since last re certification on 27/82/75, Jessica Escobedo has experienced flare up of symptoms as well as work schedule conflicts causing for scheduling 1 x per week instead of planned 2 x per week. I am recommending modifying most recent POC to extend dates x 3 more sessions over the next 3 weeks. Problem List: (Impacting functional limitations): Body Structures, Functions, Activity Limitations Requiring Skilled Therapeutic Intervention: Decreased ADL status; Decreased functional mobility ; Decreased body mechanics; Decreased tolerance to work activity; Decreased strength; Decreased endurance; Decreased balance; Decreased coordination; Increased pain; Decreased posture     Therapy Prognosis:   Therapy Prognosis: Good       PLAN   Effective Dates: 9/19/22 TO Plan of Care/Certification Expiration Date: 12/02/22     Frequency/Duration: Plan Frequency: 2 x per week for 6 weeks     Interventions Planned (Treatment may consist of any combination of the following):    Current Treatment Recommendations: Strengthening; Balance training; Functional mobility training; Transfer training; Gait training; Stair training;  Endurance training; Neuromuscular re-education; Manual Therapy - Joint Manipulation; Pain management; Return to work related activity; Home exercise program; Safety education & training; Modalities; Patient/Caregiver education & training; Therapeutic activities     GOALS: (Goals have been discussed and agreed upon with patient.)    Short-Term Goals 4 weeks Met Ongoing Not Met   Jann Agee will be instructed on an initial HEP focusing on pain management, strength and safety. [x]   []   []    Jann Agee will demonstrate SLS x > 10 sec L with good hip and knee alignment to improve functional hip and knee stability during activities. [x]   []   []    Jann Agee will report pain level over 1-2 week period of time not exceeding 4/10. [x]   []   []          Long Term Goals 6 weeks      Jann Agee will demonstrate SL squat without compensatory movements of left knee or hip to demonstrate improved functional strength and coordination of hip and knee protecting knee during ADLs and recreational/work activities   [x]   []   []    Jann Agee will demonstrate 5/5 left hip MMT all planes knee to improve ability to perform work related and daily tasks with minimal stress upon knee. [x]   []   []    Jann Agee will demonstrate improved LEFS score by 15 points or greater to show decreased functional limitations during daily activities. [x]   []   []    Jann Agee will complete > 14 sit to stand repetitions in 30 seconds to demonstrate improved functional mobility and safety during daily activities. [x]   []   []      Jann Agee be able to ascend and descend 14 stairs with reciprocal gait pattern and min or no use of UE for support safely to improve functional mobility during home and community ambulation. []   [x]   []                    Outcome Measure:      Tool Used: Lower Extremity Functional Scale (LEFS)  Score:  Initial: 53/80 Most Recent: 70/80 (Date: 10/26/22- )   Interpretation of Score: 20 questions each scored on a 5 point scale with 0 representing \"extreme difficulty or unable to perform\" and 4 representing \"no difficulty\". The lower the score, the greater the functional disability. 80/80 represents no disability. Minimal detectable change is 9 points. Medical Necessity:   Skilled intervention continues to be required due to current impairment. Reason For Services/Other Comments:  Patient continues to require skilled intervention due to patient continues to present with impairments assessed at initial evaluation and requiring skilled physical therapy to meet functional goals. Total Duration:  Time In: 1030  Time Out: 1115    Regarding Christine H Abercrombie's therapy, I certify that the treatment plan above will be carried out by a therapist or under their direction.   Thank you for this referral,  Elvin Velez, PT     Referring Physician: Verna Quan MD                    Post Session Pain  Charge Capture  PT Visit Info  POC Link  Treatment Note Link  MD Guidelines  Ailyn

## 2022-11-14 ENCOUNTER — HOSPITAL ENCOUNTER (OUTPATIENT)
Dept: PHYSICAL THERAPY | Age: 52
Setting detail: RECURRING SERIES
Discharge: HOME | End: 2022-11-17
Payer: COMMERCIAL

## 2022-11-14 PROCEDURE — 97110 THERAPEUTIC EXERCISES: CPT

## 2022-11-14 PROCEDURE — 97140 MANUAL THERAPY 1/> REGIONS: CPT

## 2022-11-14 NOTE — PROGRESS NOTES
Radha Ortez  : 1970  Primary: Linnie Severs  Secondary:  [address] @ [address] [address]  Phone: [phone]  Fax: [phone] Plan Frequency: 2 x per week for 6 weeks  Plan of Care/Certification Expiration Date: 22      PT Visit Info:  Visit Count:  8   OUTPATIENT PHYSICAL THERAPY:OP NOTE TYPE: Treatment Note 2022       Episode  }Appt Desk             Treatment Diagnosis:  Pain in Left Knee (M25.562)  Other abnormalities of gait and mobility (R26.89)  Medical/Referring Diagnosis:  Left knee pain, unspecified chronicity [M25.562]  Referring Physician:  Brooks Roldan MD MD Orders:  PT Eval and Treat 2 x 6 weeks   Date of Onset:  Onset Date: 22     Allergies:   Bee pollen and Bee venom  Restrictions/Precautions:  Restrictions/Precautions: None  Required Braces or Orthoses?: No    Interventions Planned (Treatment may consist of any combination of the following):    Current Treatment Recommendations: Strengthening; Balance training; Functional mobility training; Transfer training; Gait training; Stair training; Endurance training; Neuromuscular re-education; Manual Therapy - Joint Manipulation; Pain management; Return to work related activity; Home exercise program; Safety education & training; Modalities; Patient/Caregiver education & training; Therapeutic activities     Subjective Comments: pt states that she is still having pain of knee and some swelling but got couple days relief after last visit. Is trying to get back in with MD due to swelling recently. Initial:}    0 /10Post Session:      0  10  Medications Last Reviewed:  2022  Updated Objective Findings:  pain of joint with end range left knee  flex  Treatment   THERAPEUTIC EXERCISE: (20 minutes):    Exercises per grid below to improve mobility, strength, balance, and coordination.   Required moderate visual, verbal, manual, and tactile cues to promote proper body alignment, promote proper body posture, and promote proper body mechanics. Progressed resistance, range, and repetitions as indicated.    Date:  9/23/22 Date:  9/28/22 9/30/22 10/12/22 10/26/22 11/7/22 11/14/22   Activity/Exercise Parameters Parameters        Prone hip extension with bent knee  X 10, 5 sec    Over table x 10       clamshell OTB x 10 B          Shuttle  NV  50# 2 x 10 SL   Foam 50#   nv     Nustep  Iso 44 x 8 min  X 10 min level 6 X 10 min iso 40 rpms X 10 min 45rpm  X 10 min level 7 X 10 min iso 50 rpm  X 10 min level 6    Prone hip ER/IR          Sit to stand Touch downs with 10# weight, 2 sets 10    Touch downs with  20# 2 x 10  X 15      Calf raise  NV w/ weight  NV  2--10# plates    2 x [id number]   20# Dbs 2 x 15       Step tap  NV  Standing on Foam: alternating foot tap onto table        Monster walk  [place] 2 x 80ft  HEP  OTB 2 x 80 ft  OTB 2 x 80 ft   Bkwd 2 x 40 ft pink band    Side stepping  Pink 2 x 80ft  HEP  OTB around feet 2 x 80 ft  OTB at feet  3 x 40 ft   Pink band 2 x 40 ft    Toe/heel walk  3 x 80ft  4 x 80 ft      2 x 80 ft each   Agility ladder drills   NV   Weaving and lateral quick steps     Proprioceptive/dynamic balance   NV Step over 2x 10 lat and 2 x 10     Ball toss in static mini squat standing on foam x 2 min       T-band \"steam boat\" (next visit?) 35 sec B  SL:      On BF 3 x 20 sec SL left, cone tap right     Farmer carry   15# 2 x 80ft  2-20# 2 min        Sled     Next visit? 75# 3 x 80ft      TRX  DL squat 2 x 10   SL squat 2 x 10 (mini)        SL balance          Squat      SL x2  for assessment      Stool scoot       5 x 80 ft     Stairs      16 stair steps ascend and descend      Gait   150ft level ground      X 80 ft      Manual: (25 min) to improve joint mechanics and tissue mobility left knee   Friction massage to lateral left knee  STM to left distal hamstring   AP/PA and inf/sup mob to left prox tib fib, 3 bouts each   KT tape applied for patellar tracking and prox tib fib support       Treatment/Session Summary:    Treatment Assessment: decreased trigger points after manual techniques. KT applied to support normal joint mechanics during work shift. Discussed avoiding high level and repetitive knee exercises in HEP during current exacerbation of pain and swelling, suggesting modified activities to address strength until symptoms less irritable. Communication/Consultation:   Equipment provided today:    Recommendations/Intent for next treatment session: Next visit will focus on progressing hip/knee strengthening.   Agility/quick reaction activities     Total Treatment Billable Duration:  45 min   Time In: 1030  Time Out: 1115    PHILIPP MONTANO, PT       Charge Capture  }Post Session Pain  PT Visit Info  [facility] Portal  MD Guidelines  Scanned Media  Benefits  MyChart    Future Appointments   Date Time Provider Anita Mark   11/21/2022  9:45 AM Paralee Az, PT [facility]   11/28/2022 10:30 AM Paralee Az, PT [facility]   11/28/2022  1:00 PM MD VALENCIA Valdivia [facility]   1/31/2023 10:00 AM MD VALENCIA Valdivia [facility]   2/24/2023  9:30 AM [facility]   3/2/2023  8:40 AM Natalia Almanza MD [facility]

## 2022-11-28 ENCOUNTER — HOSPITAL ENCOUNTER (OUTPATIENT)
Dept: PHYSICAL THERAPY | Age: 52
Setting detail: RECURRING SERIES
End: 2022-11-28
Payer: COMMERCIAL

## 2022-11-28 ENCOUNTER — OFFICE VISIT (OUTPATIENT)
Dept: ORTHOPEDIC SURGERY | Age: 52
End: 2022-11-28

## 2022-11-28 DIAGNOSIS — M17.12 PRIMARY OSTEOARTHRITIS OF LEFT KNEE: Primary | ICD-10-CM

## 2022-11-28 DIAGNOSIS — S83.282D TEAR OF LATERAL MENISCUS OF LEFT KNEE, CURRENT, UNSPECIFIED TEAR TYPE, SUBSEQUENT ENCOUNTER: ICD-10-CM

## 2022-11-28 RX ORDER — METHYLPREDNISOLONE ACETATE 40 MG/ML
40 INJECTION, SUSPENSION INTRA-ARTICULAR; INTRALESIONAL; INTRAMUSCULAR; SOFT TISSUE ONCE
Status: COMPLETED | OUTPATIENT
Start: 2022-11-28 | End: 2022-11-28

## 2022-11-28 RX ADMIN — METHYLPREDNISOLONE ACETATE 40 MG: 40 INJECTION, SUSPENSION INTRA-ARTICULAR; INTRALESIONAL; INTRAMUSCULAR; SOFT TISSUE at 13:03

## 2022-11-28 NOTE — PROGRESS NOTES
Name: Fuad Connelly  YOB: 1970  Gender: female  MRN: [id number]  Date of Encounter:  11/28/2022       CHIEF COMPLAINT:     Chief Complaint   Patient presents with    Knee Pain     Left knee CSI        SUBJECTIVE/OBJECTIVE:      HPI:    Patient is a 50 y.o. pleasant female who presents today for a follow up evaluation of her left knee    Working diagnosis is: Primary osteoarthritis of left knee. LOV: 10/31/2022     On her LOV, we discussed injection of the left knee but did not perform this. She has continued to have flares of pain and swelling of the knee when she is more active. She has been going to PT. She tapes the knee. She feels like this is the same as her right knee, where Dr. Min Collier took her for an arthroscopy and it got better. She wants to see a surgeon. PAST HISTORY:   Past medical, surgical, family, social history and allergies reviewed by me. Unchanged from prior visit. REVIEW OF SYSTEMS:   As noted in HPI. PHYSICAL EXAMINATION:     Gen: Well-developed, no acute distress   HEENT: NC/AT, EOMI   Neck: Trachea midline, normal ROM   CV: Regular rhythm by palpation of distal pulse, normal capillary refill   Pulm: No respiratory distress, no stridor   Psychiatric: Well oriented, normal mood and affect. Skin: No rashes, lesions or ulcers, normal temperature, turgor, and texture on uninvolved extremity. ORTHO EXAM:     Left knee: Inspection: No ecchymosis, No erythema  Palpation: Effusion  mild;  ROM: 140 flexion, 0 extension   Tenderness: Lateral joint line  Provocative testing: (-) Mary medial , (+) Mary lateral  Strength: Extensor mechanism intact  Sensation: intact to light touch   Capillary refill normal    Gait: Normal      DIAGNOSTIC IMAGING:     I have reviewed prior imaging studies. ASSESSMENT/PLAN:   1. Primary osteoarthritis of left knee    2.  Tear of lateral meniscus of left knee, current, unspecified tear type, subsequent encounter         CSI was discussed today and she wished to proceed. Risks and benefits explained. She wishes to see a surgeon for arthroscopy. I advised we would need to obtain an MRI of the knee first to further evaluate for definitive meniscus tear. I still advise that surgery is unlikely to be of benefit and we should continue trial of PT and injection. MRI of left knee ordered to evaluate for meniscus tear. Orders:   Orders Placed This Encounter   Procedures    US ARTHR/ASP/INJ MAJOR JNT/BURSA LEFT     Standing Status:   Future     Standing Expiration Date:   11/28/2023    MRI KNEE LEFT WO CONTRAST     Standing Status:   Future     Standing Expiration Date:   11/28/2023     Scheduling Instructions:      Zena Bell - Phone: [phone] Fax: [address] [facility], [address]      [address] , [facility], [address]      Left Knee Injection - US guided      An injection of corticosteroid was discussed today and is indicated for patients pain and condition today. All risks and benefits were discussed and patient wishes to proceed. Prior to proceeding forward with a steroid injection to the left knee joint, proper informed consent was provided by the patient. Risks discussed include infection, pain, bleeding, and damage to surrounding tissue. Time-out was conducted with all members of the care team.     The patient was placed in a supine position with the left slightly flexed to 30 degrees. A superior lateral approach was utilized for this injection procedure. The ultrasound probe was use to localize the joint capsule. The site of the injection was then cleansed chlorhexidine. A sterile gel was then placed over the area and the probe was repositioned to reveal the intraarticular space. Following this a vapo coolant spray was utilized to provide local skin anesthesia. A solution of 40mg Depo-medrol and 4cc marcaine was delivered through a 22 gauge, 1.5\" into the joint capsule.  The site was again cleansed with an alcohol swab. The patient tolerated this procedure well with no adverse events. There was some notable pain relief reported by the patient prior to leaving the office today. The patient was counseled not to submerge the site for 24 hours, not to perform strenuous activity for the next five days, and if notes any signs or symptoms consistent with joint infection or allergic reaction to go to a local emergency room. The patient was observed for 15 minutes postprocedure and was allowed to be discharged from clinic in their usual state of health. Ultrasound use was deemed to be medically necessary to ensure appropriate placement of the injectate. Images were saved to PACS system. Follow Up:   Return for MRI results. The patient expressed understanding and agreed with the plan. Roxanna Wolf MD   [facility]     This document was created using voice recognition software so frequent mistakes are possible. For any concerns about the wording of this document, please contact its creator for further clarification.

## 2023-03-29 ENCOUNTER — OFFICE VISIT (OUTPATIENT)
Dept: ORTHOPEDIC SURGERY | Age: 53
End: 2023-03-29

## 2023-03-29 DIAGNOSIS — M17.12 PRIMARY OSTEOARTHRITIS OF LEFT KNEE: ICD-10-CM

## 2023-03-29 DIAGNOSIS — S83.272D COMPLEX TEAR OF LATERAL MENISCUS OF LEFT KNEE AS CURRENT INJURY, SUBSEQUENT ENCOUNTER: Primary | ICD-10-CM

## 2023-03-29 RX ORDER — METHYLPREDNISOLONE ACETATE 40 MG/ML
40 INJECTION, SUSPENSION INTRA-ARTICULAR; INTRALESIONAL; INTRAMUSCULAR; SOFT TISSUE ONCE
Status: COMPLETED | OUTPATIENT
Start: 2023-03-29 | End: 2023-03-29

## 2023-03-29 RX ADMIN — METHYLPREDNISOLONE ACETATE 40 MG: 40 INJECTION, SUSPENSION INTRA-ARTICULAR; INTRALESIONAL; INTRAMUSCULAR; SOFT TISSUE at 10:02

## 2023-03-29 NOTE — PROGRESS NOTES
discussed include infection, pain, bleeding, and damage to surrounding tissue. Time-out was conducted with all members of the care team.     The patient was placed in a supine position with the knee slightly flexed to 30 degrees. A superior lateral approach was utilized for this injection procedure. The site of the injection was then cleansed chlorhexidine. The ultrasound probe was use to localize the joint capsule. A sterile gel was then placed over the area and the probe was repositioned to reveal the intraarticular space. Following this a vapo coolant spray was utilized to provide local skin anesthesia. A solution of 40mg Depo-medrol and 4cc 1% lidocaine was delivered through a 22 gauge 1.5inch needle into the joint capsule. The site was again cleansed with an alcohol swab. The patient tolerated this procedure well with no adverse events. There was some notable pain relief reported by the patient prior to leaving the office today. The patient was counseled not to submerge the site for 24 hours, not to perform strenuous activity for the next five days, and if notes any signs or symptoms consistent with joint infection or allergic reaction to go to a local emergency room. The patient was observed for 15 minutes postprocedure and was allowed to be discharged from clinic in their usual state of health. Ultrasound use was deemed to be medically necessary to ensure appropriate placement of the injectate. Images were saved to PACS system.     Yehuda Ahmadi MD   [facility]

## 2023-05-05 ENCOUNTER — OFFICE VISIT (OUTPATIENT)
Dept: ORTHOPEDIC SURGERY | Age: 53
End: 2023-05-05

## 2023-05-05 DIAGNOSIS — S83.272D COMPLEX TEAR OF LATERAL MENISCUS OF LEFT KNEE AS CURRENT INJURY, SUBSEQUENT ENCOUNTER: ICD-10-CM

## 2023-05-05 DIAGNOSIS — M17.12 PRIMARY OSTEOARTHRITIS OF LEFT KNEE: Primary | ICD-10-CM

## 2023-05-05 RX ORDER — HYALURONATE SODIUM 10 MG/ML
20 SYRINGE (ML) INTRAARTICULAR ONCE
Status: COMPLETED | OUTPATIENT
Start: 2023-05-05 | End: 2023-05-05

## 2023-05-05 RX ADMIN — Medication 20 MG: at 09:58

## 2023-05-05 NOTE — PROGRESS NOTES
Name: Cindy Zhao  YOB: 1970  Gender: female  MRN: [id number]  Date of Encounter:  5/5/2023       CHIEF COMPLAINT:     Chief Complaint   Patient presents with    Injections     Euflexxa #2        SUBJECTIVE/OBJECTIVE:      HPI:    Patient is a 50 y.o. pleasant female who presents today for a Euflexxa injection of the left knee. 1. Primary osteoarthritis of left knee    2. Complex tear of lateral meniscus of left knee as current injury, subsequent encounter        Left Knee Injection - US guided      An injection of Euflexxa viscosupplement was discussed today and is indicated for patients pain and condition today. Risks including infection, bleeding, nerve damage, and pain at the site of injection were discussed at length with the patient. Benefits including pain relief were also discussed with the patient. Patient verbalizes understanding of these and agrees to procedure. she consents to this procedure. Time-out was conducted with all members of the care team.     The patient was placed in a supine position with the left slightly flexed to 30 degrees. A superior lateral approach was utilized for this injection procedure. The ultrasound probe was use to localize the joint capsule. The site of the injection was then cleansed chlorhexidine. A sterile gel was then placed over the area and the probe was repositioned to reveal the intraarticular space. Following this a vapo coolant spray was utilized to provide local skin anesthesia. A  Euflexxa injection was delivered through a 22 gauge, 1.5\" needle into the joint capsule. The site was again cleansed with an alcohol swab. The patient tolerated this procedure well with no adverse events. There was some notable pain relief reported by the patient prior to leaving the office today.  The patient was counseled not to submerge the site for 24 hours, not to perform strenuous activity for the next five days, and if notes any signs or symptoms

## 2023-05-19 ENCOUNTER — OFFICE VISIT (OUTPATIENT)
Dept: ORTHOPEDIC SURGERY | Age: 53
End: 2023-05-19

## 2023-05-19 DIAGNOSIS — M17.12 PRIMARY OSTEOARTHRITIS OF LEFT KNEE: Primary | ICD-10-CM

## 2023-05-19 RX ORDER — HYALURONATE SODIUM 10 MG/ML
20 SYRINGE (ML) INTRAARTICULAR ONCE
Status: COMPLETED | OUTPATIENT
Start: 2023-05-19 | End: 2023-05-19

## 2023-05-19 RX ADMIN — Medication 20 MG: at 10:41

## 2023-05-19 NOTE — PROGRESS NOTES
Name: Christy Pepper  YOB: 1970  Gender: female  MRN: [id number]  Date of Encounter:  5/19/2023       CHIEF COMPLAINT:     Chief Complaint   Patient presents with    Injections     Left knee, Euflexxa #2        SUBJECTIVE/OBJECTIVE:      HPI:    Patient is a 50 y.o. pleasant female who presents today for a Euflexxa injection of the left knee #2. 1. Primary osteoarthritis of left knee        Left Knee Injection - US guided      An injection of Euflexxa viscosupplement was discussed today and is indicated for patients pain and condition today. Risks including infection, bleeding, nerve damage, and pain at the site of injection were discussed at length with the patient. Benefits including pain relief were also discussed with the patient. Patient verbalizes understanding of these and agrees to procedure. she consents to this procedure. Time-out was conducted with all members of the care team.     The patient was placed in a supine position with the left slightly flexed to 30 degrees. A superior lateral approach was utilized for this injection procedure. The ultrasound probe was use to localize the joint capsule. The site of the injection was then cleansed chlorhexidine. A sterile gel was then placed over the area and the probe was repositioned to reveal the intraarticular space. Following this a vapo coolant spray was utilized to provide local skin anesthesia. A  Euflexxa injection was delivered through a 22 gauge, 1.5\" needle into the joint capsule. The site was again cleansed with an alcohol swab. The patient tolerated this procedure well with no adverse events. There was some notable pain relief reported by the patient prior to leaving the office today.  The patient was counseled not to submerge the site for 24 hours, not to perform strenuous activity for the next five days, and if notes any signs or symptoms consistent with joint infection or allergic reaction to go to a local emergency

## 2023-05-24 ENCOUNTER — OFFICE VISIT (OUTPATIENT)
Dept: ORTHOPEDIC SURGERY | Age: 53
End: 2023-05-24

## 2023-05-24 DIAGNOSIS — M17.12 PRIMARY OSTEOARTHRITIS OF LEFT KNEE: Primary | ICD-10-CM

## 2023-05-24 RX ORDER — HYALURONATE SODIUM 10 MG/ML
20 SYRINGE (ML) INTRAARTICULAR ONCE
Status: COMPLETED | OUTPATIENT
Start: 2023-05-24 | End: 2023-05-24

## 2023-05-24 RX ADMIN — Medication 20 MG: at 11:44

## 2023-05-24 NOTE — PROGRESS NOTES
Name: Linda Rosales  YOB: 1970  Gender: female  MRN: [id number]  Date of Encounter:  5/24/2023       CHIEF COMPLAINT:     Chief Complaint   Patient presents with    Injections     Left knee, Euflexxa #3        SUBJECTIVE/OBJECTIVE:      HPI:    Patient is a 50 y.o. pleasant female who presents today for a Euflexxa injection of the left knee. 1. Primary osteoarthritis of left knee        Left Knee Injection - US guided      An injection of Euflexxa viscosupplement was discussed today and is indicated for patients pain and condition today. Risks including infection, bleeding, nerve damage, and pain at the site of injection were discussed at length with the patient. Benefits including pain relief were also discussed with the patient. Patient verbalizes understanding of these and agrees to procedure. she consents to this procedure. Time-out was conducted with all members of the care team.     The patient was placed in a supine position with the left slightly flexed to 30 degrees. A superior lateral approach was utilized for this injection procedure. The ultrasound probe was use to localize the joint capsule. The site of the injection was then cleansed chlorhexidine. A sterile gel was then placed over the area and the probe was repositioned to reveal the intraarticular space. Following this a vapo coolant spray was utilized to provide local skin anesthesia. A  Euflexxa injection was delivered through a 22 gauge, 1.5\" needle into the joint capsule. The site was again cleansed with an alcohol swab. The patient tolerated this procedure well with no adverse events. The patient was counseled not to submerge the site for 24 hours, not to perform strenuous activity for the next five days, and if notes any signs or symptoms consistent with joint infection or allergic reaction to go to a local emergency room.  The patient was observed for 15 minutes postprocedure and was allowed to be discharged from
160

## 2023-07-25 NOTE — ED TRIAGE NOTES
\"Last week my left knee started hurting. I thought it was muscle pain from the Lipitor. But then it got worse.  I have an appt on Thursday with [facility]\"
warm and dry/color normal/normal/no rashes/no ulcers

## 2023-09-15 ENCOUNTER — TELEPHONE (OUTPATIENT)
Dept: FAMILY MEDICINE CLINIC | Facility: CLINIC | Age: 53
End: 2023-09-15

## 2023-09-15 DIAGNOSIS — Z11.4 SCREENING FOR HIV (HUMAN IMMUNODEFICIENCY VIRUS): ICD-10-CM

## 2023-09-15 DIAGNOSIS — Z11.59 NEED FOR HEPATITIS C SCREENING TEST: ICD-10-CM

## 2023-09-15 DIAGNOSIS — R73.01 IMPAIRED FASTING GLUCOSE: Primary | ICD-10-CM

## 2023-09-15 DIAGNOSIS — E78.00 HYPERCHOLESTEREMIA: ICD-10-CM

## 2023-09-18 ENCOUNTER — TELEPHONE (OUTPATIENT)
Dept: INTERNAL MEDICINE CLINIC | Facility: CLINIC | Age: 53
End: 2023-09-18

## 2023-09-18 DIAGNOSIS — E78.00 PURE HYPERCHOLESTEROLEMIA, UNSPECIFIED: ICD-10-CM

## 2023-09-18 DIAGNOSIS — R73.01 IMPAIRED FASTING GLUCOSE: Primary | ICD-10-CM

## 2023-09-18 DIAGNOSIS — Z11.4 SCREENING FOR HIV (HUMAN IMMUNODEFICIENCY VIRUS): ICD-10-CM

## 2023-09-18 DIAGNOSIS — Z11.59 NEED FOR HEPATITIS C SCREENING TEST: ICD-10-CM

## 2023-09-18 DIAGNOSIS — R73.01 IMPAIRED FASTING GLUCOSE: ICD-10-CM

## 2023-09-18 DIAGNOSIS — E78.00 HYPERCHOLESTEREMIA: ICD-10-CM

## 2023-09-18 NOTE — TELEPHONE ENCOUNTER
----- Message from [facility] [employer] sent at 9/18/2023 10:05 AM EDT -----  Regarding: Regarding  Lab Orders   Good Morning Avery Shaffer,        This Patient came in for labs today 9/18/2023 @ 9:15 AM. Patient apparently felt sick while out in the waiting room after checking in and decided to cancel her appointment and reschedule for another day. I had already released her orders and printed her labels before Chucky informed me she had cancelled. Her new lab appointment is on 9/27/2023 @ 9:30 AM. Can you please reorder her labs which are,     Hepatitis C Antibody(KHL361)    HIV 1/2 Ag/Ab, 4TH Generation,  W Rflx Confirm ([id number])    Comprehensive Metabolic   Panel (EKN60)    Lipid Panel (LAB18)    FjpinaenzwS0G (LAB90)      Sorry for any inconvenience and Thank you.

## 2023-09-26 DIAGNOSIS — F41.8 DEPRESSION WITH ANXIETY: ICD-10-CM

## 2023-09-27 DIAGNOSIS — Z11.59 NEED FOR HEPATITIS C SCREENING TEST: ICD-10-CM

## 2023-09-27 DIAGNOSIS — Z11.4 SCREENING FOR HIV (HUMAN IMMUNODEFICIENCY VIRUS): ICD-10-CM

## 2023-09-27 DIAGNOSIS — R73.01 IMPAIRED FASTING GLUCOSE: ICD-10-CM

## 2023-09-27 DIAGNOSIS — E78.00 PURE HYPERCHOLESTEROLEMIA, UNSPECIFIED: ICD-10-CM

## 2023-09-27 LAB
ALBUMIN SERPL-MCNC: 3.5 G/DL (ref 3.5–5)
ALBUMIN/GLOB SERPL: 1 (ref 0.4–1.6)
ALP SERPL-CCNC: 105 U/L (ref 50–136)
ALT SERPL-CCNC: 21 U/L (ref 12–65)
ANION GAP SERPL CALC-SCNC: 5 MMOL/L (ref 2–11)
AST SERPL-CCNC: 14 U/L (ref 15–37)
BILIRUB SERPL-MCNC: 0.3 MG/DL (ref 0.2–1.1)
BUN SERPL-MCNC: 11 MG/DL (ref 6–23)
CALCIUM SERPL-MCNC: 8.9 MG/DL (ref 8.3–10.4)
CHLORIDE SERPL-SCNC: 112 MMOL/L (ref 101–110)
CHOLEST SERPL-MCNC: 206 MG/DL
CO2 SERPL-SCNC: 26 MMOL/L (ref 21–32)
CREAT SERPL-MCNC: 0.8 MG/DL (ref 0.6–1)
ERYTHROCYTE [DISTWIDTH] IN BLOOD BY AUTOMATED COUNT: 12.1 % (ref 11.9–14.6)
GLOBULIN SER CALC-MCNC: 3.5 G/DL (ref 2.8–4.5)
GLUCOSE SERPL-MCNC: 101 MG/DL (ref 65–100)
HCT VFR BLD AUTO: 40.6 % (ref 35.8–46.3)
HCV AB SER QL: NONREACTIVE
HDLC SERPL-MCNC: 57 MG/DL (ref 40–60)
HDLC SERPL: 3.6
HGB BLD-MCNC: 12.9 G/DL (ref 11.7–15.4)
HIV 1+2 AB+HIV1 P24 AG SERPL QL IA: NONREACTIVE
HIV 1/2 RESULT COMMENT: NORMAL
LDLC SERPL CALC-MCNC: 129.2 MG/DL
MCH RBC QN AUTO: 30 PG (ref 26.1–32.9)
MCHC RBC AUTO-ENTMCNC: 31.8 G/DL (ref 31.4–35)
MCV RBC AUTO: 94.4 FL (ref 82–102)
NRBC # BLD: 0 K/UL (ref 0–0.2)
PLATELET # BLD AUTO: 304 K/UL (ref 150–450)
PMV BLD AUTO: 10.5 FL (ref 9.4–12.3)
POTASSIUM SERPL-SCNC: 4.3 MMOL/L (ref 3.5–5.1)
PROT SERPL-MCNC: 7 G/DL (ref 6.3–8.2)
RBC # BLD AUTO: 4.3 M/UL (ref 4.05–5.2)
SODIUM SERPL-SCNC: 143 MMOL/L (ref 133–143)
TRIGL SERPL-MCNC: 99 MG/DL (ref 35–150)
VLDLC SERPL CALC-MCNC: 19.8 MG/DL (ref 6–23)
WBC # BLD AUTO: 6 K/UL (ref 4.3–11.1)

## 2023-09-28 LAB
EST. AVERAGE GLUCOSE BLD GHB EST-MCNC: 117 MG/DL
HBA1C MFR BLD: 5.7 % (ref 4.8–5.6)

## 2023-10-02 NOTE — PROGRESS NOTES
10/4/2023     Subjective:     Chief Complaint   Patient presents with    Annual Exam     Lab results  Patient requesting new medication prescription for Xanax       HPI:     Health Maintenance     Last Physical 2022  Last PAP 2020 prefers to do next year  Last Mammogram 2022  Last colonoscopy 3/2022 polyps repeat 3/2027  Last bone density no  Last eye exam 2023  Last dental exam 2023  Exercise none but walks at work  ACP no     Immunizations  Pneumonia - Prevnar no  Pneumovax no  Tdap 2016   TD no  Shingles has had shingles - last episode was 2017, needs rx  Flu vaccine 2021  Covid vaccine no     Hyperlipidemia  The patient is following a low fat diet and is not exercising regularly. Did not take with cholesterol meds. Patient is not having associated symptoms of shortness of breath, chest pain, rapid heart rate, irregular heart rate, and dizziness    Patient labs are YOUR LAST LIPID PROFILE:   Lab Results   Component Value Date    CHOL 206 (H) 09/27/2023    CHOL 244 (H) 08/16/2022    CHOL 245 (H) 02/08/2022     Lab Results   Component Value Date    TRIG 99 09/27/2023    TRIG 121 08/16/2022    TRIG 86 02/08/2022     Lab Results   Component Value Date    HDL 57 09/27/2023    HDL 71 (H) 08/16/2022    HDL 71 02/08/2022     Lab Results   Component Value Date    LDLCALC 129.2 (H) 09/27/2023    LDLCALC 148.8 (H) 08/16/2022    LDLCALC 159 (H) 02/08/2022     Lab Results   Component Value Date    LABVLDL 19.8 09/27/2023    LABVLDL 24.2 (H) 08/16/2022    LABVLDL 20 07/21/2020    VLDL 15 02/08/2022    VLDL 9 08/05/2021    VLDL 14 01/22/2021     Lab Results   Component Value Date    CHOLHDLRATIO 3.6 09/27/2023    CHOLHDLRATIO 3.4 08/16/2022       Hypertension   She is not exercising and is adherent to low salt diet. Cardiac symptoms: none. Cardiovascular risk factors: dyslipidemia, hypertension, and obesity (BMI >= 30 kg/m2). Blood pressure is not measured at home.        Depression with anxiety  Taking Xanax as

## 2023-10-04 ENCOUNTER — OFFICE VISIT (OUTPATIENT)
Dept: INTERNAL MEDICINE CLINIC | Facility: CLINIC | Age: 53
End: 2023-10-04
Payer: COMMERCIAL

## 2023-10-04 VITALS
TEMPERATURE: 97.3 F | HEIGHT: 64 IN | DIASTOLIC BLOOD PRESSURE: 88 MMHG | OXYGEN SATURATION: 96 % | WEIGHT: 186.5 LBS | RESPIRATION RATE: 18 BRPM | SYSTOLIC BLOOD PRESSURE: 128 MMHG | BODY MASS INDEX: 31.84 KG/M2 | HEART RATE: 80 BPM

## 2023-10-04 DIAGNOSIS — Z78.0 POSTMENOPAUSAL STATUS: ICD-10-CM

## 2023-10-04 DIAGNOSIS — Z00.00 ROUTINE GENERAL MEDICAL EXAMINATION AT A HEALTH CARE FACILITY: Primary | ICD-10-CM

## 2023-10-04 DIAGNOSIS — Z23 NEED FOR INFLUENZA VACCINATION: ICD-10-CM

## 2023-10-04 DIAGNOSIS — E78.00 PURE HYPERCHOLESTEROLEMIA, UNSPECIFIED: ICD-10-CM

## 2023-10-04 DIAGNOSIS — Z91.030 ALLERGY TO BEE STING: ICD-10-CM

## 2023-10-04 DIAGNOSIS — F41.8 DEPRESSION WITH ANXIETY: ICD-10-CM

## 2023-10-04 DIAGNOSIS — R73.01 IMPAIRED FASTING GLUCOSE: ICD-10-CM

## 2023-10-04 DIAGNOSIS — I10 ESSENTIAL HYPERTENSION, BENIGN: ICD-10-CM

## 2023-10-04 DIAGNOSIS — Z87.891 EX-CIGARETTE SMOKER: ICD-10-CM

## 2023-10-04 PROCEDURE — 99396 PREV VISIT EST AGE 40-64: CPT | Performed by: FAMILY MEDICINE

## 2023-10-04 PROCEDURE — 90674 CCIIV4 VAC NO PRSV 0.5 ML IM: CPT | Performed by: FAMILY MEDICINE

## 2023-10-04 PROCEDURE — 3074F SYST BP LT 130 MM HG: CPT | Performed by: FAMILY MEDICINE

## 2023-10-04 PROCEDURE — 3079F DIAST BP 80-89 MM HG: CPT | Performed by: FAMILY MEDICINE

## 2023-10-04 PROCEDURE — 90471 IMMUNIZATION ADMIN: CPT | Performed by: FAMILY MEDICINE

## 2023-10-04 RX ORDER — EPINEPHRINE 0.3 MG/.3ML
0.3 INJECTION SUBCUTANEOUS ONCE
Qty: 0.3 ML | Refills: 1 | Status: SHIPPED | OUTPATIENT
Start: 2023-10-04 | End: 2023-10-04

## 2023-10-04 RX ORDER — ALPRAZOLAM 1 MG/1
1 TABLET ORAL 2 TIMES DAILY PRN
Qty: 60 TABLET | Refills: 0 | Status: SHIPPED | OUTPATIENT
Start: 2023-10-04 | End: 2023-11-03

## 2023-10-04 SDOH — ECONOMIC STABILITY: FOOD INSECURITY: WITHIN THE PAST 12 MONTHS, YOU WORRIED THAT YOUR FOOD WOULD RUN OUT BEFORE YOU GOT MONEY TO BUY MORE.: NEVER TRUE

## 2023-10-04 SDOH — ECONOMIC STABILITY: FOOD INSECURITY: WITHIN THE PAST 12 MONTHS, THE FOOD YOU BOUGHT JUST DIDN'T LAST AND YOU DIDN'T HAVE MONEY TO GET MORE.: NEVER TRUE

## 2023-10-04 SDOH — ECONOMIC STABILITY: INCOME INSECURITY: HOW HARD IS IT FOR YOU TO PAY FOR THE VERY BASICS LIKE FOOD, HOUSING, MEDICAL CARE, AND HEATING?: NOT HARD AT ALL

## 2023-10-04 SDOH — ECONOMIC STABILITY: HOUSING INSECURITY
IN THE LAST 12 MONTHS, WAS THERE A TIME WHEN YOU DID NOT HAVE A STEADY PLACE TO SLEEP OR SLEPT IN A SHELTER (INCLUDING NOW)?: NO

## 2023-10-04 ASSESSMENT — PATIENT HEALTH QUESTIONNAIRE - PHQ9
SUM OF ALL RESPONSES TO PHQ QUESTIONS 1-9: 3
2. FEELING DOWN, DEPRESSED OR HOPELESS: 0
1. LITTLE INTEREST OR PLEASURE IN DOING THINGS: 0
SUM OF ALL RESPONSES TO PHQ QUESTIONS 1-9: 3
7. TROUBLE CONCENTRATING ON THINGS, SUCH AS READING THE NEWSPAPER OR WATCHING TELEVISION: 0
9. THOUGHTS THAT YOU WOULD BE BETTER OFF DEAD, OR OF HURTING YOURSELF: 0
SUM OF ALL RESPONSES TO PHQ9 QUESTIONS 1 & 2: 0
6. FEELING BAD ABOUT YOURSELF - OR THAT YOU ARE A FAILURE OR HAVE LET YOURSELF OR YOUR FAMILY DOWN: 0
4. FEELING TIRED OR HAVING LITTLE ENERGY: 0
5. POOR APPETITE OR OVEREATING: 0
8. MOVING OR SPEAKING SO SLOWLY THAT OTHER PEOPLE COULD HAVE NOTICED. OR THE OPPOSITE, BEING SO FIGETY OR RESTLESS THAT YOU HAVE BEEN MOVING AROUND A LOT MORE THAN USUAL: 0
SUM OF ALL RESPONSES TO PHQ QUESTIONS 1-9: 3
10. IF YOU CHECKED OFF ANY PROBLEMS, HOW DIFFICULT HAVE THESE PROBLEMS MADE IT FOR YOU TO DO YOUR WORK, TAKE CARE OF THINGS AT HOME, OR GET ALONG WITH OTHER PEOPLE: 1
SUM OF ALL RESPONSES TO PHQ QUESTIONS 1-9: 3
3. TROUBLE FALLING OR STAYING ASLEEP: 3

## 2023-10-04 ASSESSMENT — ENCOUNTER SYMPTOMS
ABDOMINAL PAIN: 0
BLOOD IN STOOL: 0
SHORTNESS OF BREATH: 0

## 2023-10-24 ENCOUNTER — OFFICE VISIT (OUTPATIENT)
Dept: ORTHOPEDIC SURGERY | Age: 53
End: 2023-10-24

## 2023-10-24 DIAGNOSIS — M17.12 PRIMARY OSTEOARTHRITIS OF LEFT KNEE: Primary | ICD-10-CM

## 2023-10-24 RX ORDER — METHYLPREDNISOLONE ACETATE 40 MG/ML
40 INJECTION, SUSPENSION INTRA-ARTICULAR; INTRALESIONAL; INTRAMUSCULAR; SOFT TISSUE ONCE
Status: COMPLETED | OUTPATIENT
Start: 2023-10-24 | End: 2023-10-24

## 2023-10-24 RX ADMIN — METHYLPREDNISOLONE ACETATE 40 MG: 40 INJECTION, SUSPENSION INTRA-ARTICULAR; INTRALESIONAL; INTRAMUSCULAR; SOFT TISSUE at 15:28

## 2023-10-24 NOTE — PROGRESS NOTES
Name: Lei Sloan  YOB: 1970  Gender: female  MRN: [id number]  Date of Encounter:  10/24/2023       CHIEF COMPLAINT:     Chief Complaint   Patient presents with    Follow-up     Left Knee        SUBJECTIVE/OBJECTIVE:      HPI:    Patient is a 48 y.o. pleasant female who presents today for a return evaluation of her left knee. Working diagnosis:   1. Primary osteoarthritis of left knee       LOV: 5/24/2023     Recall hx: Angie Helms has had chronic bilateral knee pain more in the left knee without acute injury. We saw her last year and performed CSI of the knee which gave her pain relief. Ultimately she had an MRI of the knee showing medial chondromalacia, meniscus tearing, and subchondral edema. HA series was recommended and completed 5/24/23. She comes in today for increasing medial knee pain requesting CSI and surgical evaluation. PAST HISTORY:   Past medical, surgical, family, social history and allergies reviewed by me. Unchanged from prior visit. REVIEW OF SYSTEMS:   As noted in HPI. PHYSICAL EXAMINATION:     Gen: Well-developed, no acute distress   HEENT: NC/AT, EOMI   Neck: Trachea midline, normal ROM   CV: Regular rhythm by palpation of distal pulse, normal capillary refill   Pulm: No respiratory distress, no stridor   Psychiatric: Well oriented, normal mood and affect. Skin: No rashes, lesions or ulcers, normal temperature, turgor, and texture on uninvolved extremity. ORTHO EXAM:    Left knee: Inspection: No deformity, No erythema  Palpation: Effusion  Mild  ROM: 130 flexion, 0 extension   Tenderness: medial joint line is moderately tender  Provocative testing: mild pain with medial yomaira. Negative varus / valgus laxity or pain  Strength: Extensor mechanism intact  Sensation: intact to light touch   Capillary refill normal       DIAGNOSTIC IMAGING:     I have reviewed prior imaging studies. No new imaging studies required today. ASSESSMENT/PLAN:   1.

## 2023-11-15 ENCOUNTER — HOSPITAL ENCOUNTER (OUTPATIENT)
Dept: MAMMOGRAPHY | Age: 53
Discharge: HOME | End: 2023-11-18
Attending: FAMILY MEDICINE
Payer: COMMERCIAL

## 2023-11-15 DIAGNOSIS — Z12.31 VISIT FOR SCREENING MAMMOGRAM: ICD-10-CM

## 2023-11-15 PROCEDURE — 77063 BREAST TOMOSYNTHESIS BI: CPT

## 2023-11-15 PROCEDURE — 77067 SCR MAMMO BI INCL CAD: CPT

## 2023-12-05 ENCOUNTER — HOSPITAL ENCOUNTER (OUTPATIENT)
Dept: MAMMOGRAPHY | Age: 53
Discharge: HOME | End: 2023-12-08
Attending: FAMILY MEDICINE
Payer: COMMERCIAL

## 2023-12-05 DIAGNOSIS — Z78.0 POSTMENOPAUSAL STATUS: ICD-10-CM

## 2023-12-05 PROCEDURE — 77080 DXA BONE DENSITY AXIAL: CPT

## 2024-01-01 NOTE — THERAPY EVALUATION
Gurpreet Modesto  : 1970  Primary: Kathy Berry  Secondary:  [address] [facility] [address]  Phone: [phone]  Fax: [phone] Plan Frequency: 2 x per week for 6 weeks  Plan of Care/Certification Expiration Date: 10/31/22    PT Visit Info:    Visit Count:  1    OUTPATIENT PHYSICAL THERAPY:OP NOTE TYPE: Initial Assessment 2022               Episode  Appt Desk         Treatment Diagnosis:  Pain in Left Knee (M25.562)  Other abnormalities of gait and mobility (R26.89)  Left knee pain, unspecified chronicity  Medical/Referring Diagnosis:  Left knee pain, unspecified chronicity [M25.562]  Referring Physician:  Roland Boudreaux MD MD Orders:  PT Eval and Treat 2-3 / wk x 6 weeks   Return MD Appt: 2022  Date of Onset:  Onset Date: 22   Allergies:  Bee pollen and Bee venom  Restrictions/Precautions:    Restrictions/Precautions: None  Required Braces or Orthoses?: No   Medications Last Reviewed:  2022     SUBJECTIVE   History of Injury/Illness (Reason for Referral):    Pt states insidious onset of left knee pian about a month ago which she thought may be related to new cholesterol medication recently began however, when ceased meds, pain continued. Last week, she was pushing heavy object at work with her left foot and felt sharp severe pain of knee. Pain continued unable to walk. Went to ER next day, xrays negative, given antiinflammatory and pain medication and referred to orthopedist.  Medication successful to decrease symptoms but pain still limiting activity. At orthopedic visit, MD ordered weight bearing x-rays which were negative and referred to PT with f/u in 6 weeks. Pt states left knee pain mostly with lateral and rotational movement as well as feeling of instability during weight bearing activities. She wears a knee sleeve for compression which she reports is somewhat helpful.      Patient Stated Goal(s):  \"knee stability\"    Initial symptom description: sharp intermittent pain of med and lat aspect of left knee during side to side or twisting movements , aching after work shift, and the overall feeling of instability during weightbearing; no popping clicking , instability   PAIN LEVELS   Current:0/10      Worst: 10/10  (6-7/10 over last 5 days)     Best: 0/10   Aggravating factors: descending stairs, lateral and rotational movements, turning in bed, squatting, work tasks  Relieving factors: ice application, oral antiinflammatory meds, limiting activities, neoprene sleeve     Past Medical History/Comorbidities:   Ms. Joshua Mo  has a past medical history of Depression, Essential hypertension, benign, Generalized anxiety disorder, H/O seasonal allergies, History of shingles, PTSD (post-traumatic stress disorder), Situational anxiety, and Sleep apnea. Ms. Joshua Mo  has a past surgical history that includes Endometrial ablation (2001); Septoplasty (1990's); Appendectomy (2013); Breast surgery (Left, 2019); orthopedic surgery (12/03/2018); Breast biopsy (Left); Tubal ligation (2000); Hemorrhoid surgery (2002); and LIDA STEREO BREAST BX W LOC DEVICE 1ST LESION LEFT (Left, 4/10/2019). Social History/Living Environment:   No data recorded   Prior Level of Function/Work/Activity:   Full time  for [employer]   Job requires: Pressing foot pedals, significant walking with change in direction, lifting, pushing , pulling     Learning:   Does the patient/guardian have any barriers to learning?: No barriers  Will there be a co-learner?: No  Is an  required?: No  How does the patient/guardian prefer to learn new concepts?: Listening; Reading; Demonstration; Pictures/Videos   Fall Risk Scale:    Total Score: 0  Butler Fall Risk: Low (0-24)   Dominant Side:  right handed  Other Clinical Tests:  X-rays left knee (neg)  Previous Treatment Approaches:  Medication, rest       OBJECTIVE increase strength of core, hip and knee; proprioception and coordination training through neuro muscular reeducation to improve knee stability during functional /work related tasks and reduce functional limitations or further injury. Problem List: (Impacting functional limitations): Body Structures, Functions, Activity Limitations Requiring Skilled Therapeutic Intervention: Decreased ADL status; Decreased functional mobility ; Decreased body mechanics; Decreased tolerance to work activity; Decreased strength; Decreased endurance; Decreased balance; Decreased coordination; Increased pain; Decreased posture   Therapy Prognosis:   Therapy Prognosis: Good   Assessment Complexity:   Low Complexity  PLAN   Effective Dates: 9/19/22 TO Plan of Care/Certification Expiration Date: 10/31/22   Frequency/Duration: Plan Frequency: 2 x per week for 6 weeks   Interventions Planned (Treatment may consist of any combination of the following):    Current Treatment Recommendations: Strengthening; Balance training; Functional mobility training; Transfer training; Gait training; Stair training; Endurance training; Neuromuscular re-education; Manual Therapy - Joint Manipulation; Pain management; Return to work related activity; Home exercise program; Safety education & training; Modalities; Patient/Caregiver education & training; Therapeutic activities   GOALS: (Goals have been discussed and agreed upon with patient.)    Short-Term Goals 4 weeks Met Ongoing Not Met   [place] Punt will be instructed on an initial HEP focusing on pain management, strength and safety.   []   []   []    [place] Punt will demonstrate SLS x > 10 sec L with good hip and knee alignment to improve functional hip and knee stability during activities. []   []   []    [place] Punt will report pain level over 1-2 week period of time not exceeding 4/10.    []   []   []          Long Term Goals 6 weeks      Amanuel Edgar Layton Sparrow will demonstrate SL squat without compensatory movements of left knee or hip to demonstrate improved functional strength and coordination of hip and knee protecting knee during ADLs and recreational/work activities   []   []   []    Munira Cook will demonstrate 5/5 left hip MMT all planes knee to improve ability to perform work related and daily tasks with minimal stress upon knee.    []   []   []    Munira Cook will demonstrate improved LEFS score by 15 points or greater to show decreased functional limitations during daily activities. []   []   []    Munira Cook will complete > 14 sit to stand repetitions in 30 seconds to demonstrate improved functional mobility and safety during daily activities. []   []   []      Munira Cook be able to ascend and descend 14 stairs with reciprocal gait pattern and min or no use of UE for support safely to improve functional mobility during home and community ambulation.   []   []   []                    Outcome Measure: Tool Used: Lower Extremity Functional Scale (LEFS)  Score:  Initial: 53/80 Most Recent: X/80 (Date: -- )   Interpretation of Score: 20 questions each scored on a 5 point scale with 0 representing \"extreme difficulty or unable to perform\" and 4 representing \"no difficulty\". The lower the score, the greater the functional disability. 80/80 represents no disability. Minimal detectable change is 9 points. Medical Necessity:   Skilled intervention continues to be required due to current impairment. Reason For Services/Other Comments:  Patient continues to require skilled intervention due to patient continues to present with impairments assessed at initial evaluation and requiring skilled physical therapy to meet functional goals.    Total Duration:  Time In: 0910  Time Out: 1030    Regarding Christine H Abercrombie's therapy, I certify that the treatment plan above will be carried out by a therapist or under their direction.   Thank you for this referral,  Elmer Rodriguez, PT     Referring Physician: Rivera Garcia MD                    Post Session Pain  Charge Capture  PT Visit Info  POC Link  Treatment Note Link  MD Guidelines  MyChart [Alert] : alert [Normocephalic] : normocephalic [Flat Open Anterior [place]] : flat open anterior fontanelle [PERRL] : PERRL [Red Reflex Bilateral] : red reflex bilateral [Normally Placed Ears] : normally placed ears [Auricles Well Formed] : auricles well formed [Clear Tympanic membranes] : clear tympanic membranes [Light reflex present] : light reflex present [Bony landmarks visible] : bony landmarks visible [Nares Patent] : nares patent [Palate Intact] : palate intact [Uvula Midline] : uvula midline [Supple, full passive range of motion] : supple, full passive range of motion [Symmetric Chest Rise] : symmetric chest rise [Clear to Auscultation Bilaterally] : clear to auscultation bilaterally [Regular Rate and Rhythm] : regular rate and rhythm [S1, S2 present] : S1, S2 present [+2 Femoral Pulses] : +2 femoral pulses [Soft] : soft [Normal external genitailia] : normal external genitalia [Patent Vagina] : vagina patent [Normally Placed] : normally placed [No Abnormal Lymph Nodes Palpated] : no abnormal lymph nodes palpated [Symmetric Flexed Extremities] : symmetric flexed extremities [Startle Reflex] : startle reflex present [Suck Reflex] : suck reflex present [Rooting] : rooting reflex present [Palmar Grasp] : palmar grasp reflex present [Plantar Grasp] : plantar grasp reflex present [Symmetric [place]] : symmetric [place] [Acute Distress] : no acute distress [Discharge] : no discharge [Palpable Masses] : no palpable masses [Murmurs] : no murmurs [Tender] : nontender [Distended] : not distended [Clitoromegaly] : no clitoromegaly [Santoro-Ortolani] : negative Santoro-Ortolani [Spinal Dimple] : no spinal dimple [Tuft of Hair] : no tuft of hair [Jaundice] : no jaundice [Rash and/or lesion present] : no rash/lesion [FreeTextEntry9] : + umbilical polyp vs granuloma

## 2024-02-06 ENCOUNTER — TELEPHONE (OUTPATIENT)
Dept: INTERNAL MEDICINE CLINIC | Facility: CLINIC | Age: 54
End: 2024-02-06

## 2024-02-06 NOTE — TELEPHONE ENCOUNTER
Patient calling she states  she was Referred to a Dermatologist and she wanted to know the name of the Dermatologist

## 2024-02-06 NOTE — TELEPHONE ENCOUNTER
Spoke with pt and informed them they were referred to [facility] back in 2022; pt states they would like a new referral.  Please advise if pt needs a new appt or if a referral can be placed.

## 2024-03-07 ENCOUNTER — APPOINTMENT (RX ONLY)
Dept: URBAN - METROPOLITAN AREA CLINIC 329 | Facility: CLINIC | Age: 54
Setting detail: DERMATOLOGY
End: 2024-03-07

## 2024-03-07 DIAGNOSIS — D18.0 HEMANGIOMA: ICD-10-CM

## 2024-03-07 DIAGNOSIS — D485 NEOPLASM OF UNCERTAIN BEHAVIOR OF SKIN: ICD-10-CM

## 2024-03-07 DIAGNOSIS — L82.1 OTHER SEBORRHEIC KERATOSIS: ICD-10-CM

## 2024-03-07 DIAGNOSIS — L81.4 OTHER MELANIN HYPERPIGMENTATION: ICD-10-CM

## 2024-03-07 DIAGNOSIS — D22 MELANOCYTIC NEVI: ICD-10-CM

## 2024-03-07 PROBLEM — D18.01 HEMANGIOMA OF SKIN AND SUBCUTANEOUS TISSUE: Status: ACTIVE | Noted: 2024-03-07

## 2024-03-07 PROBLEM — D48.5 NEOPLASM OF UNCERTAIN BEHAVIOR OF SKIN: Status: ACTIVE | Noted: 2024-03-07

## 2024-03-07 PROBLEM — D22.5 MELANOCYTIC NEVI OF TRUNK: Status: ACTIVE | Noted: 2024-03-07

## 2024-03-07 PROCEDURE — 99203 OFFICE O/P NEW LOW 30 MIN: CPT | Mod: 25

## 2024-03-07 PROCEDURE — 11102 TANGNTL BX SKIN SINGLE LES: CPT

## 2024-03-07 PROCEDURE — ? COUNSELING

## 2024-03-07 PROCEDURE — ? BIOPSY BY SHAVE METHOD

## 2024-03-07 PROCEDURE — ? FULL BODY SKIN EXAM

## 2024-03-07 ASSESSMENT — LOCATION DETAILED DESCRIPTION DERM
LOCATION DETAILED: LEFT MEDIAL SUPERIOR CHEST
LOCATION DETAILED: LEFT MEDIAL UPPER BACK
LOCATION DETAILED: INFERIOR THORACIC SPINE
LOCATION DETAILED: RIGHT MEDIAL UPPER BACK
LOCATION DETAILED: LEFT INFERIOR UPPER BACK

## 2024-03-07 ASSESSMENT — LOCATION SIMPLE DESCRIPTION DERM
LOCATION SIMPLE: CHEST
LOCATION SIMPLE: LEFT UPPER BACK
LOCATION SIMPLE: RIGHT UPPER BACK
LOCATION SIMPLE: UPPER BACK

## 2024-03-07 ASSESSMENT — LOCATION ZONE DERM: LOCATION ZONE: TRUNK

## 2024-03-07 NOTE — PROCEDURE: BIOPSY BY SHAVE METHOD
Detail Level: Detailed
Depth Of Biopsy: dermis
Was A Bandage Applied: Yes
Size Of Lesion In Cm: 1
X Size Of Lesion In Cm: 0
Biopsy Type: H and E
Biopsy Method: Dermablade
Anesthesia Type: 1% lidocaine with epinephrine
Anesthesia Volume In Cc: 0.5
Hemostasis: Drysol
Wound Care: Petrolatum
Dressing: bandage
Destruction After The Procedure: No
Type Of Destruction Used: Curettage
Curettage Text: The wound bed was treated with curettage after the biopsy was performed.
Cryotherapy Text: The wound bed was treated with cryotherapy after the biopsy was performed.
Electrodesiccation Text: The wound bed was treated with electrodesiccation after the biopsy was performed.
Electrodesiccation And Curettage Text: The wound bed was treated with electrodesiccation and curettage after the biopsy was performed.
Silver Nitrate Text: The wound bed was treated with silver nitrate after the biopsy was performed.
Lab: 6
Lab Facility: 3
Consent: Written consent was obtained and risks were reviewed including but not limited to scarring, infection, bleeding, scabbing, incomplete removal, nerve damage and allergy to anesthesia.
Post-Care Instructions: I reviewed with the patient in detail post-care instructions. Patient is to keep the biopsy site dry overnight, and then apply bacitracin twice daily until healed. Patient may apply hydrogen peroxide soaks to remove any crusting.
Notification Instructions: Patient will be notified of biopsy results. However, patient instructed to call the office if not contacted within 2 weeks.
Billing Type: Third-Party Bill
Information: Selecting Yes will display possible errors in your note based on the variables you have selected. This validation is only offered as a suggestion for you. PLEASE NOTE THAT THE VALIDATION TEXT WILL BE REMOVED WHEN YOU FINALIZE YOUR NOTE. IF YOU WANT TO FAX A PRELIMINARY NOTE YOU WILL NEED TO TOGGLE THIS TO 'NO' IF YOU DO NOT WANT IT IN YOUR FAXED NOTE.

## 2024-03-07 NOTE — HPI: SKIN LESION
How Severe Is Your Skin Lesion?: mild
Is This A New Presentation, Or A Follow-Up?: Skin Lesions
Additional History: Pt states she has a new spot on her chest that won’t go away

## 2024-03-07 NOTE — PROCEDURE: MIPS QUALITY
Detail Level: Zone
Quality 431: Preventive Care And Screening: Unhealthy Alcohol Use - Screening: Patient not identified as an unhealthy alcohol user when screened for unhealthy alcohol use using a systematic screening method

## 2024-04-02 NOTE — PROGRESS NOTES
Known Problems Father     Heart Disease Maternal Grandmother     Breast Cancer Maternal Grandmother         60's    Breast Cancer Paternal Grandmother         70's     Social History     Socioeconomic History    Marital status:      Spouse name: None    Number of children: None    Years of education: None    Highest education level: None   Tobacco Use    Smoking status: Former     Current packs/day: 0.00     Average packs/day: 2.0 packs/day for 21.7 years (43.4 ttl pk-yrs)     Types: Cigarettes     Start date: 1992     Quit date: 2014     Years since quittin.6     Passive exposure: Past    Smokeless tobacco: Never    Tobacco comments:     Quit smokin years ago   Substance and Sexual Activity    Alcohol use: Yes     Alcohol/week: 1.0 - 2.0 standard drink of alcohol    Drug use: Not Currently     Social Determinants of Health     Financial Resource Strain: Low Risk  (10/4/2023)    Overall Financial Resource Strain (CARDIA)     Difficulty of Paying Living Expenses: Not hard at all   Transportation Needs: Unknown (10/4/2023)    PRAPARE - Transportation     Lack of Transportation (Non-Medical): No   Housing Stability: Unknown (10/4/2023)    Housing Stability Vital Sign     Unstable Housing in the Last Year: No      Current Outpatient Medications   Medication Sig Dispense Refill    sertraline (ZOLOFT) 50 MG tablet Take 1 tablet by mouth daily 90 tablet 3    EPINEPHrine (EPIPEN 2-JEANE) 0.3 MG/0.3ML SOAJ injection Inject 0.3 mLs into the muscle once for 1 dose Use as directed for allergic reaction 0.3 mL 1    cetirizine (ZYRTEC) 10 MG tablet Take 1 tablet by mouth daily as needed      ibuprofen (ADVIL;MOTRIN) 200 MG CAPS Take 1 capsule by mouth as needed for Pain       No current facility-administered medications for this visit.     Allergies   Allergen Reactions    Bee Pollen Other (See Comments)    Bee Venom Swelling       Objective:   BP (!) 139/90 (Site: Left Upper Arm, Position: Sitting, Cuff

## 2024-04-04 ENCOUNTER — OFFICE VISIT (OUTPATIENT)
Dept: INTERNAL MEDICINE CLINIC | Facility: CLINIC | Age: 54
End: 2024-04-04
Payer: COMMERCIAL

## 2024-04-04 VITALS
BODY MASS INDEX: 32.95 KG/M2 | RESPIRATION RATE: 18 BRPM | OXYGEN SATURATION: 98 % | WEIGHT: 193 LBS | HEIGHT: 64 IN | SYSTOLIC BLOOD PRESSURE: 139 MMHG | DIASTOLIC BLOOD PRESSURE: 90 MMHG | HEART RATE: 88 BPM

## 2024-04-04 DIAGNOSIS — F41.8 DEPRESSION WITH ANXIETY: Primary | ICD-10-CM

## 2024-04-04 DIAGNOSIS — I10 ELEVATED BLOOD PRESSURE READING IN OFFICE WITH DIAGNOSIS OF HYPERTENSION: ICD-10-CM

## 2024-04-04 PROCEDURE — 3080F DIAST BP >= 90 MM HG: CPT | Performed by: FAMILY MEDICINE

## 2024-04-04 PROCEDURE — 3075F SYST BP GE 130 - 139MM HG: CPT | Performed by: FAMILY MEDICINE

## 2024-04-04 PROCEDURE — 99214 OFFICE O/P EST MOD 30 MIN: CPT | Performed by: FAMILY MEDICINE

## 2024-04-04 RX ORDER — ALPRAZOLAM 1 MG/1
1 TABLET ORAL 2 TIMES DAILY PRN
Qty: 60 TABLET | Refills: 1 | Status: SHIPPED | OUTPATIENT
Start: 2024-04-04 | End: 2024-06-03

## 2024-04-04 ASSESSMENT — PATIENT HEALTH QUESTIONNAIRE - PHQ9
7. TROUBLE CONCENTRATING ON THINGS, SUCH AS READING THE NEWSPAPER OR WATCHING TELEVISION: NOT AT ALL
4. FEELING TIRED OR HAVING LITTLE ENERGY: MORE THAN HALF THE DAYS
9. THOUGHTS THAT YOU WOULD BE BETTER OFF DEAD, OR OF HURTING YOURSELF: NOT AT ALL
SUM OF ALL RESPONSES TO PHQ9 QUESTIONS 1 & 2: 0
10. IF YOU CHECKED OFF ANY PROBLEMS, HOW DIFFICULT HAVE THESE PROBLEMS MADE IT FOR YOU TO DO YOUR WORK, TAKE CARE OF THINGS AT HOME, OR GET ALONG WITH OTHER PEOPLE: SOMEWHAT DIFFICULT
SUM OF ALL RESPONSES TO PHQ QUESTIONS 1-9: 5
3. TROUBLE FALLING OR STAYING ASLEEP: NEARLY EVERY DAY
6. FEELING BAD ABOUT YOURSELF - OR THAT YOU ARE A FAILURE OR HAVE LET YOURSELF OR YOUR FAMILY DOWN: NOT AT ALL
8. MOVING OR SPEAKING SO SLOWLY THAT OTHER PEOPLE COULD HAVE NOTICED. OR THE OPPOSITE, BEING SO FIGETY OR RESTLESS THAT YOU HAVE BEEN MOVING AROUND A LOT MORE THAN USUAL: NOT AT ALL
SUM OF ALL RESPONSES TO PHQ QUESTIONS 1-9: 5
2. FEELING DOWN, DEPRESSED OR HOPELESS: NOT AT ALL
SUM OF ALL RESPONSES TO PHQ QUESTIONS 1-9: 5
5. POOR APPETITE OR OVEREATING: NOT AT ALL
1. LITTLE INTEREST OR PLEASURE IN DOING THINGS: NOT AT ALL
SUM OF ALL RESPONSES TO PHQ QUESTIONS 1-9: 5

## 2024-04-04 ASSESSMENT — ENCOUNTER SYMPTOMS
ABDOMINAL PAIN: 0
SHORTNESS OF BREATH: 0
BLOOD IN STOOL: 0

## 2024-05-14 ENCOUNTER — TELEPHONE (OUTPATIENT)
Dept: ORTHOPEDIC SURGERY | Age: 54
End: 2024-05-14

## 2024-05-15 ENCOUNTER — OFFICE VISIT (OUTPATIENT)
Dept: BEHAVIORAL/MENTAL HEALTH CLINIC | Facility: CLINIC | Age: 54
End: 2024-05-15
Payer: COMMERCIAL

## 2024-05-15 VITALS
DIASTOLIC BLOOD PRESSURE: 80 MMHG | OXYGEN SATURATION: 97 % | SYSTOLIC BLOOD PRESSURE: 138 MMHG | BODY MASS INDEX: 33.26 KG/M2 | HEART RATE: 97 BPM | WEIGHT: 194.8 LBS | HEIGHT: 64 IN

## 2024-05-15 DIAGNOSIS — G47.00 INSOMNIA, UNSPECIFIED TYPE: ICD-10-CM

## 2024-05-15 DIAGNOSIS — F41.9 ANXIETY DISORDER, UNSPECIFIED TYPE: ICD-10-CM

## 2024-05-15 DIAGNOSIS — F43.10 PTSD (POST-TRAUMATIC STRESS DISORDER): ICD-10-CM

## 2024-05-15 DIAGNOSIS — F33.1 MAJOR DEPRESSIVE DISORDER, RECURRENT, MODERATE (HCC): Primary | ICD-10-CM

## 2024-05-15 PROCEDURE — 90792 PSYCH DIAG EVAL W/MED SRVCS: CPT | Performed by: PSYCHIATRY & NEUROLOGY

## 2024-05-15 RX ORDER — HYDROXYZINE HYDROCHLORIDE 25 MG/1
25 TABLET, FILM COATED ORAL NIGHTLY PRN
Qty: 30 TABLET | Refills: 1 | Status: SHIPPED | OUTPATIENT
Start: 2024-05-15

## 2024-05-15 ASSESSMENT — PATIENT HEALTH QUESTIONNAIRE - PHQ9
4. FEELING TIRED OR HAVING LITTLE ENERGY: NEARLY EVERY DAY
5. POOR APPETITE OR OVEREATING: NEARLY EVERY DAY
10. IF YOU CHECKED OFF ANY PROBLEMS, HOW DIFFICULT HAVE THESE PROBLEMS MADE IT FOR YOU TO DO YOUR WORK, TAKE CARE OF THINGS AT HOME, OR GET ALONG WITH OTHER PEOPLE: VERY DIFFICULT
1. LITTLE INTEREST OR PLEASURE IN DOING THINGS: NOT AT ALL
2. FEELING DOWN, DEPRESSED OR HOPELESS: MORE THAN HALF THE DAYS
6. FEELING BAD ABOUT YOURSELF - OR THAT YOU ARE A FAILURE OR HAVE LET YOURSELF OR YOUR FAMILY DOWN: NEARLY EVERY DAY
SUM OF ALL RESPONSES TO PHQ9 QUESTIONS 1 & 2: 2
8. MOVING OR SPEAKING SO SLOWLY THAT OTHER PEOPLE COULD HAVE NOTICED. OR THE OPPOSITE, BEING SO FIGETY OR RESTLESS THAT YOU HAVE BEEN MOVING AROUND A LOT MORE THAN USUAL: NOT AT ALL
SUM OF ALL RESPONSES TO PHQ QUESTIONS 1-9: 17
SUM OF ALL RESPONSES TO PHQ QUESTIONS 1-9: 17
7. TROUBLE CONCENTRATING ON THINGS, SUCH AS READING THE NEWSPAPER OR WATCHING TELEVISION: NEARLY EVERY DAY
3. TROUBLE FALLING OR STAYING ASLEEP: NEARLY EVERY DAY
SUM OF ALL RESPONSES TO PHQ QUESTIONS 1-9: 17
SUM OF ALL RESPONSES TO PHQ QUESTIONS 1-9: 17

## 2024-05-15 ASSESSMENT — ANXIETY QUESTIONNAIRES
6. BECOMING EASILY ANNOYED OR IRRITABLE: MORE THAN HALF THE DAYS
3. WORRYING TOO MUCH ABOUT DIFFERENT THINGS: NEARLY EVERY DAY
4. TROUBLE RELAXING: MORE THAN HALF THE DAYS
2. NOT BEING ABLE TO STOP OR CONTROL WORRYING: NEARLY EVERY DAY
GAD7 TOTAL SCORE: 16
IF YOU CHECKED OFF ANY PROBLEMS ON THIS QUESTIONNAIRE, HOW DIFFICULT HAVE THESE PROBLEMS MADE IT FOR YOU TO DO YOUR WORK, TAKE CARE OF THINGS AT HOME, OR GET ALONG WITH OTHER PEOPLE: VERY DIFFICULT
7. FEELING AFRAID AS IF SOMETHING AWFUL MIGHT HAPPEN: NOT AT ALL
1. FEELING NERVOUS, ANXIOUS, OR ON EDGE: NEARLY EVERY DAY
5. BEING SO RESTLESS THAT IT IS HARD TO SIT STILL: NEARLY EVERY DAY
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
## 2024-07-01 ENCOUNTER — OFFICE VISIT (OUTPATIENT)
Dept: ORTHOPEDIC SURGERY | Age: 54
End: 2024-07-01
Payer: COMMERCIAL

## 2024-07-01 VITALS — WEIGHT: 180 LBS | HEIGHT: 64 IN | BODY MASS INDEX: 30.73 KG/M2

## 2024-07-01 DIAGNOSIS — M25.562 CHRONIC PAIN OF LEFT KNEE: ICD-10-CM

## 2024-07-01 DIAGNOSIS — M23.204 OLD COMPLEX TEAR OF MEDIAL MENISCUS OF LEFT KNEE: Primary | ICD-10-CM

## 2024-07-01 DIAGNOSIS — G89.29 CHRONIC PAIN OF LEFT KNEE: ICD-10-CM

## 2024-07-01 DIAGNOSIS — M17.12 PRIMARY OSTEOARTHRITIS OF LEFT KNEE: ICD-10-CM

## 2024-07-01 PROCEDURE — 99204 OFFICE O/P NEW MOD 45 MIN: CPT | Performed by: ORTHOPAEDIC SURGERY

## 2024-07-01 NOTE — PROGRESS NOTES
Name: Christine H Abercrombie  YOB: 1970  Gender: female  MRN: [id number]    CC: Knee Pain (L)     HPI: Christine H Abercrombie is a 53 y.o. female who presents with Knee Pain (L)  She states she is having left knee pain for years.  She understands that she has arthritic changes but her main complaint today is pain when she steps wrong or pivots.  She is failed conservative management of a cortisone injection and gel injections.  She states the cortisone injections helped for a limited amount of time towards the gel injections did not help at all.  She states that she walks a lot at work and if she steps a certain way she flares her knee.  She notes swelling that improves with ice and diclofenac.  She does utilize a compression sleeve every now and then.  She does not utilize a cane or walker at this time.  She states she knows she has a meniscus tear from a MRI from December 2022 but did not have surgery for it and was treating it conservatively.  She is here today because she wants to talk about left knee arthroscopy.    ROS/Meds/PSH/PMH/FH/SH: I personally reviewed the patients standard intake form.  Below are the pertinents    Tobacco:  reports that she quit smoking about 9 years ago. Her smoking use included cigarettes. She started smoking about 31 years ago. She has a 43.4 pack-year smoking history. She has been exposed to tobacco smoke. She has never used smokeless tobacco.  Diabetes: none  Other: CHRIS, hypertension, PTSD    Physical Examination:  General: no acute distress  Lungs: breathing easily  CV: regular rhythm by pulse  Left Knee: No previous surgical scars present. No joint effusion present. Patella tracks centrally with no patellofemoral grind. Neutral mechanical alignment present. Passive ROM: 5 degrees of hyperextension with 125 degrees of flexion. Stable to varus and valgus stress at full extension and 30 degrees of flexion. Negative Lachman's. negative anterior drawer. negative

## 2024-07-02 DIAGNOSIS — M23.204 OLD COMPLEX TEAR OF MEDIAL MENISCUS OF LEFT KNEE: ICD-10-CM

## 2024-07-02 DIAGNOSIS — G89.29 CHRONIC PAIN OF LEFT KNEE: ICD-10-CM

## 2024-07-02 DIAGNOSIS — S83.272D COMPLEX TEAR OF LATERAL MENISCUS OF LEFT KNEE AS CURRENT INJURY, SUBSEQUENT ENCOUNTER: Primary | ICD-10-CM

## 2024-07-02 DIAGNOSIS — M25.562 LEFT KNEE PAIN, UNSPECIFIED CHRONICITY: ICD-10-CM

## 2024-07-02 DIAGNOSIS — M25.562 CHRONIC PAIN OF LEFT KNEE: ICD-10-CM

## 2024-07-02 DIAGNOSIS — M17.12 PRIMARY OSTEOARTHRITIS OF LEFT KNEE: ICD-10-CM

## 2024-07-02 DIAGNOSIS — M25.362 KNEE INSTABILITY, LEFT: ICD-10-CM

## 2024-07-08 DIAGNOSIS — S83.272D COMPLEX TEAR OF LATERAL MENISCUS OF LEFT KNEE AS CURRENT INJURY, SUBSEQUENT ENCOUNTER: Primary | ICD-10-CM

## 2024-07-08 DIAGNOSIS — M17.12 PRIMARY OSTEOARTHRITIS OF LEFT KNEE: ICD-10-CM

## 2024-07-08 DIAGNOSIS — M23.204 OLD COMPLEX TEAR OF MEDIAL MENISCUS OF LEFT KNEE: ICD-10-CM

## 2024-07-08 DIAGNOSIS — M25.362 KNEE INSTABILITY, LEFT: ICD-10-CM

## 2024-07-08 DIAGNOSIS — M25.562 CHRONIC PAIN OF LEFT KNEE: ICD-10-CM

## 2024-07-08 DIAGNOSIS — M25.362 INSTABILITY OF KNEE JOINT, LEFT: ICD-10-CM

## 2024-07-08 DIAGNOSIS — G89.29 CHRONIC PAIN OF LEFT KNEE: ICD-10-CM

## 2024-07-08 PROBLEM — S83.272A COMPLEX TEAR OF LATERAL MENISCUS OF LEFT KNEE AS CURRENT INJURY: Status: ACTIVE | Noted: 2024-07-08

## 2024-07-16 ENCOUNTER — PATIENT MESSAGE (OUTPATIENT)
Dept: ORTHOPEDIC SURGERY | Age: 54
End: 2024-07-16

## 2024-07-23 ENCOUNTER — OFFICE VISIT (OUTPATIENT)
Dept: BEHAVIORAL/MENTAL HEALTH CLINIC | Facility: CLINIC | Age: 54
End: 2024-07-23
Payer: COMMERCIAL

## 2024-07-23 VITALS
OXYGEN SATURATION: 98 % | HEART RATE: 100 BPM | WEIGHT: 192 LBS | BODY MASS INDEX: 32.78 KG/M2 | DIASTOLIC BLOOD PRESSURE: 104 MMHG | HEIGHT: 64 IN | SYSTOLIC BLOOD PRESSURE: 164 MMHG

## 2024-07-23 DIAGNOSIS — G47.00 INSOMNIA, UNSPECIFIED TYPE: ICD-10-CM

## 2024-07-23 DIAGNOSIS — F41.9 ANXIETY DISORDER, UNSPECIFIED TYPE: ICD-10-CM

## 2024-07-23 DIAGNOSIS — F43.10 PTSD (POST-TRAUMATIC STRESS DISORDER): ICD-10-CM

## 2024-07-23 DIAGNOSIS — F33.1 MAJOR DEPRESSIVE DISORDER, RECURRENT, MODERATE (HCC): Primary | ICD-10-CM

## 2024-07-23 PROCEDURE — 99214 OFFICE O/P EST MOD 30 MIN: CPT | Performed by: PSYCHIATRY & NEUROLOGY

## 2024-07-23 PROCEDURE — 3080F DIAST BP >= 90 MM HG: CPT | Performed by: PSYCHIATRY & NEUROLOGY

## 2024-07-23 PROCEDURE — 3077F SYST BP >= 140 MM HG: CPT | Performed by: PSYCHIATRY & NEUROLOGY

## 2024-07-23 PROCEDURE — 90833 PSYTX W PT W E/M 30 MIN: CPT | Performed by: PSYCHIATRY & NEUROLOGY

## 2024-07-23 RX ORDER — HYDROXYZINE HYDROCHLORIDE 25 MG/1
25 TABLET, FILM COATED ORAL NIGHTLY PRN
Qty: 30 TABLET | Refills: 2 | Status: SHIPPED | OUTPATIENT
Start: 2024-07-23

## 2024-07-23 RX ORDER — ALPRAZOLAM 1 MG/1
1 TABLET ORAL 2 TIMES DAILY PRN
COMMUNITY
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
## 2024-09-24 ENCOUNTER — LAB (OUTPATIENT)
Dept: INTERNAL MEDICINE CLINIC | Facility: CLINIC | Age: 54
End: 2024-09-24

## 2024-09-24 DIAGNOSIS — E78.00 PURE HYPERCHOLESTEROLEMIA, UNSPECIFIED: ICD-10-CM

## 2024-09-24 DIAGNOSIS — R73.01 IMPAIRED FASTING GLUCOSE: ICD-10-CM

## 2024-09-24 LAB
ALBUMIN SERPL-MCNC: 3.6 G/DL (ref 3.5–5)
ALBUMIN/GLOB SERPL: 1 (ref 1–1.9)
ALP SERPL-CCNC: 91 U/L (ref 35–104)
ALT SERPL-CCNC: 17 U/L (ref 8–45)
ANION GAP SERPL CALC-SCNC: 9 MMOL/L (ref 9–18)
AST SERPL-CCNC: 19 U/L (ref 15–37)
BILIRUB SERPL-MCNC: <0.2 MG/DL (ref 0–1.2)
BUN SERPL-MCNC: 10 MG/DL (ref 6–23)
CALCIUM SERPL-MCNC: 9 MG/DL (ref 8.8–10.2)
CHLORIDE SERPL-SCNC: 106 MMOL/L (ref 98–107)
CHOLEST SERPL-MCNC: 204 MG/DL (ref 0–200)
CO2 SERPL-SCNC: 26 MMOL/L (ref 20–28)
CREAT SERPL-MCNC: 0.76 MG/DL (ref 0.6–1.1)
EST. AVERAGE GLUCOSE BLD GHB EST-MCNC: 122 MG/DL
GLOBULIN SER CALC-MCNC: 3.5 G/DL (ref 2.3–3.5)
GLUCOSE SERPL-MCNC: 110 MG/DL (ref 70–99)
HBA1C MFR BLD: 5.9 % (ref 0–5.6)
HDLC SERPL-MCNC: 48 MG/DL (ref 40–60)
HDLC SERPL: 4.3 (ref 0–5)
LDLC SERPL CALC-MCNC: 123 MG/DL (ref 0–100)
POTASSIUM SERPL-SCNC: 4.2 MMOL/L (ref 3.5–5.1)
PROT SERPL-MCNC: 7.1 G/DL (ref 6.3–8.2)
SODIUM SERPL-SCNC: 141 MMOL/L (ref 136–145)
TRIGL SERPL-MCNC: 168 MG/DL (ref 0–150)
VLDLC SERPL CALC-MCNC: 34 MG/DL (ref 6–23)

## 2024-10-04 ENCOUNTER — OFFICE VISIT (OUTPATIENT)
Dept: INTERNAL MEDICINE CLINIC | Facility: CLINIC | Age: 54
End: 2024-10-04
Payer: COMMERCIAL

## 2024-10-04 VITALS
DIASTOLIC BLOOD PRESSURE: 82 MMHG | OXYGEN SATURATION: 98 % | HEIGHT: 64 IN | TEMPERATURE: 98.4 F | HEART RATE: 80 BPM | SYSTOLIC BLOOD PRESSURE: 127 MMHG | BODY MASS INDEX: 32.95 KG/M2 | WEIGHT: 193 LBS

## 2024-10-04 DIAGNOSIS — R73.09 ELEVATED HEMOGLOBIN A1C: ICD-10-CM

## 2024-10-04 DIAGNOSIS — E66.9 OBESITY (BMI 30-39.9): ICD-10-CM

## 2024-10-04 DIAGNOSIS — Z12.31 BREAST CANCER SCREENING BY MAMMOGRAM: ICD-10-CM

## 2024-10-04 DIAGNOSIS — Z00.00 ANNUAL PHYSICAL EXAM: Primary | ICD-10-CM

## 2024-10-04 DIAGNOSIS — E78.00 PURE HYPERCHOLESTEROLEMIA: ICD-10-CM

## 2024-10-04 PROCEDURE — 3079F DIAST BP 80-89 MM HG: CPT | Performed by: FAMILY MEDICINE

## 2024-10-04 PROCEDURE — 99396 PREV VISIT EST AGE 40-64: CPT | Performed by: FAMILY MEDICINE

## 2024-10-04 PROCEDURE — 3074F SYST BP LT 130 MM HG: CPT | Performed by: FAMILY MEDICINE

## 2024-10-04 SDOH — ECONOMIC STABILITY: INCOME INSECURITY: HOW HARD IS IT FOR YOU TO PAY FOR THE VERY BASICS LIKE FOOD, HOUSING, MEDICAL CARE, AND HEATING?: NOT HARD AT ALL

## 2024-10-04 SDOH — ECONOMIC STABILITY: FOOD INSECURITY: WITHIN THE PAST 12 MONTHS, THE FOOD YOU BOUGHT JUST DIDN'T LAST AND YOU DIDN'T HAVE MONEY TO GET MORE.: NEVER TRUE

## 2024-10-04 SDOH — ECONOMIC STABILITY: FOOD INSECURITY: WITHIN THE PAST 12 MONTHS, YOU WORRIED THAT YOUR FOOD WOULD RUN OUT BEFORE YOU GOT MONEY TO BUY MORE.: NEVER TRUE

## 2024-10-04 NOTE — PROGRESS NOTES
Day Reed,   [facility]  [address] [place] [address]  Phone [phone]  Fax [phone]      Christine H Abercrombie (: 1970) is a 54 y.o. female, here for evaluation of the following chief complaint(s):  Established New Doctor and Annual Exam (Last colonoscopy done , eye exam done yearly , mammogram done 11/15/23 and PAP done )       ASSESSMENT/PLAN:  1. Annual physical exam  -     CBC with Auto Differential; Future  -     Comprehensive Metabolic Panel; Future  -     Hemoglobin A1C; Future  -     Lipid Panel; Future  -     TSH; Future  2. Pure hypercholesterolemia  Overview:  Explain the need to avoid fatty foods, fried foods, red meats, and sweets. Increase intake of vegetables, lean meats, and reduce intake of carbs. Advise to do minimum of 20-30 minutes of daily exercise.   Orders:  -     CBC with Auto Differential; Future  -     Comprehensive Metabolic Panel; Future  -     Lipid Panel; Future  -     TSH; Future  3. Elevated hemoglobin A1c  -     Hemoglobin A1C; Future  4. Obesity (BMI 30-39.9)  Overview:  Encourage healthy eating, exercise.  Suggest decreasing carbohydrate intake and increasing fresh vegetables.  Will continue to periodically follow progress of weight loss.  Orders:  -     Hemoglobin A1C; Future  -     Lipid Panel; Future  -     TSH; Future  5. Breast cancer screening by mammogram  -     [facility] GUSTAVO DIGITAL SCREEN BILATERAL; Future    Stable. PE findings discussed. Labs reviewed in office. Preventative medicine and health maintenance discussed. Diet and exercise discussed. Healthy lifestyle advised.    The patient was seen for the annual preventive health visit.  The patient was provided anticipatory guidance and counseling regarding age / sex appropriate health maintenance issues including vaccinations, blood pressure screening, lipid screening, cancer screenings, diet, exercise, avoidance of tobacco / substance abuse.    All

## 2024-10-14 PROBLEM — R73.09 ELEVATED HEMOGLOBIN A1C: Status: ACTIVE | Noted: 2024-10-14

## 2024-10-14 PROBLEM — E78.00 PURE HYPERCHOLESTEROLEMIA: Status: ACTIVE | Noted: 2024-10-14

## 2024-10-23 ENCOUNTER — OFFICE VISIT (OUTPATIENT)
Dept: BEHAVIORAL/MENTAL HEALTH CLINIC | Facility: CLINIC | Age: 54
End: 2024-10-23
Payer: COMMERCIAL

## 2024-10-23 ENCOUNTER — PATIENT MESSAGE (OUTPATIENT)
Dept: BEHAVIORAL/MENTAL HEALTH CLINIC | Facility: CLINIC | Age: 54
End: 2024-10-23

## 2024-10-23 VITALS
WEIGHT: 189.2 LBS | BODY MASS INDEX: 32.3 KG/M2 | HEART RATE: 70 BPM | SYSTOLIC BLOOD PRESSURE: 154 MMHG | OXYGEN SATURATION: 98 % | DIASTOLIC BLOOD PRESSURE: 96 MMHG | HEIGHT: 64 IN

## 2024-10-23 DIAGNOSIS — F33.1 MAJOR DEPRESSIVE DISORDER, RECURRENT, MODERATE (HCC): ICD-10-CM

## 2024-10-23 DIAGNOSIS — F41.9 ANXIETY DISORDER, UNSPECIFIED TYPE: ICD-10-CM

## 2024-10-23 DIAGNOSIS — F43.10 PTSD (POST-TRAUMATIC STRESS DISORDER): ICD-10-CM

## 2024-10-23 DIAGNOSIS — G47.00 INSOMNIA, UNSPECIFIED TYPE: ICD-10-CM

## 2024-10-23 DIAGNOSIS — F43.23 ADJUSTMENT DISORDER WITH MIXED ANXIETY AND DEPRESSED MOOD: Primary | ICD-10-CM

## 2024-10-23 PROCEDURE — 3080F DIAST BP >= 90 MM HG: CPT | Performed by: PSYCHIATRY & NEUROLOGY

## 2024-10-23 PROCEDURE — 3077F SYST BP >= 140 MM HG: CPT | Performed by: PSYCHIATRY & NEUROLOGY

## 2024-10-23 PROCEDURE — 99214 OFFICE O/P EST MOD 30 MIN: CPT | Performed by: PSYCHIATRY & NEUROLOGY

## 2024-10-23 PROCEDURE — 90833 PSYTX W PT W E/M 30 MIN: CPT | Performed by: PSYCHIATRY & NEUROLOGY

## 2024-10-23 RX ORDER — HYDROXYZINE HYDROCHLORIDE 25 MG/1
25 TABLET, FILM COATED ORAL NIGHTLY PRN
Qty: 30 TABLET | Refills: 1 | Status: SHIPPED | OUTPATIENT
Start: 2024-10-23

## 2024-10-23 ASSESSMENT — PATIENT HEALTH QUESTIONNAIRE - PHQ9
3. TROUBLE FALLING OR STAYING ASLEEP: MORE THAN HALF THE DAYS
8. MOVING OR SPEAKING SO SLOWLY THAT OTHER PEOPLE COULD HAVE NOTICED. OR THE OPPOSITE, BEING SO FIGETY OR RESTLESS THAT YOU HAVE BEEN MOVING AROUND A LOT MORE THAN USUAL: NOT AT ALL
5. POOR APPETITE OR OVEREATING: SEVERAL DAYS
10. IF YOU CHECKED OFF ANY PROBLEMS, HOW DIFFICULT HAVE THESE PROBLEMS MADE IT FOR YOU TO DO YOUR WORK, TAKE CARE OF THINGS AT HOME, OR GET ALONG WITH OTHER PEOPLE: NOT DIFFICULT AT ALL
SUM OF ALL RESPONSES TO PHQ QUESTIONS 1-9: 16
SUM OF ALL RESPONSES TO PHQ QUESTIONS 1-9: 16
2. FEELING DOWN, DEPRESSED OR HOPELESS: NEARLY EVERY DAY
9. THOUGHTS THAT YOU WOULD BE BETTER OFF DEAD, OR OF HURTING YOURSELF: NOT AT ALL
6. FEELING BAD ABOUT YOURSELF - OR THAT YOU ARE A FAILURE OR HAVE LET YOURSELF OR YOUR FAMILY DOWN: NEARLY EVERY DAY
4. FEELING TIRED OR HAVING LITTLE ENERGY: NEARLY EVERY DAY
1. LITTLE INTEREST OR PLEASURE IN DOING THINGS: NEARLY EVERY DAY
7. TROUBLE CONCENTRATING ON THINGS, SUCH AS READING THE NEWSPAPER OR WATCHING TELEVISION: SEVERAL DAYS
SUM OF ALL RESPONSES TO PHQ QUESTIONS 1-9: 16
SUM OF ALL RESPONSES TO PHQ9 QUESTIONS 1 & 2: 6
SUM OF ALL RESPONSES TO PHQ QUESTIONS 1-9: 16

## 2024-10-23 ASSESSMENT — ANXIETY QUESTIONNAIRES
2. NOT BEING ABLE TO STOP OR CONTROL WORRYING: NEARLY EVERY DAY
GAD7 TOTAL SCORE: 20
3. WORRYING TOO MUCH ABOUT DIFFERENT THINGS: NEARLY EVERY DAY
5. BEING SO RESTLESS THAT IT IS HARD TO SIT STILL: NEARLY EVERY DAY
1. FEELING NERVOUS, ANXIOUS, OR ON EDGE: NEARLY EVERY DAY
7. FEELING AFRAID AS IF SOMETHING AWFUL MIGHT HAPPEN: MORE THAN HALF THE DAYS
4. TROUBLE RELAXING: NEARLY EVERY DAY
6. BECOMING EASILY ANNOYED OR IRRITABLE: NEARLY EVERY DAY
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
## 2024-11-18 ENCOUNTER — HOSPITAL ENCOUNTER (OUTPATIENT)
Dept: MAMMOGRAPHY | Age: 54
Discharge: HOME | End: 2024-11-21
Attending: FAMILY MEDICINE
Payer: COMMERCIAL

## 2024-11-18 ENCOUNTER — OFFICE VISIT (OUTPATIENT)
Dept: ORTHOPEDIC SURGERY | Age: 54
End: 2024-11-18

## 2024-11-18 ENCOUNTER — CLINICAL DOCUMENTATION (OUTPATIENT)
Dept: ORTHOPEDIC SURGERY | Age: 54
End: 2024-11-18

## 2024-11-18 VITALS — WEIGHT: 189 LBS | HEIGHT: 64 IN | BODY MASS INDEX: 32.27 KG/M2

## 2024-11-18 DIAGNOSIS — S83.272A COMPLEX TEAR OF LATERAL MENISCUS OF LEFT KNEE AS CURRENT INJURY, INITIAL ENCOUNTER: Primary | ICD-10-CM

## 2024-11-18 DIAGNOSIS — Z12.31 BREAST CANCER SCREENING BY MAMMOGRAM: ICD-10-CM

## 2024-11-18 PROCEDURE — 77063 BREAST TOMOSYNTHESIS BI: CPT

## 2024-11-18 RX ORDER — HYDROCODONE BITARTRATE AND ACETAMINOPHEN 5; 325 MG/1; MG/1
1 TABLET ORAL EVERY 4 HOURS PRN
Qty: 30 TABLET | Refills: 0 | Status: SHIPPED | OUTPATIENT
Start: 2024-11-18 | End: 2024-11-23

## 2024-11-18 RX ORDER — ONDANSETRON 8 MG/1
4 TABLET, ORALLY DISINTEGRATING ORAL EVERY 6 HOURS
Qty: 16 TABLET | Refills: 0 | Status: SHIPPED | OUTPATIENT
Start: 2024-11-18

## 2024-11-18 NOTE — PROGRESS NOTES
Name: Christine H Abercrombie  YOB: 1970  Gender: female  MRN: [id number]    CC: Knee Pain (L)     HPI: Christine H Abercrombie is a 54 y.o. female who presents today for preoperative evaluation.     She has had left knee pain for years.  She has arthritic changes but her main complaint today is pain when she steps wrong or pivots.  She is failed conservative management of a cortisone injection and gel injections.  She states the cortisone injections helped for a limited amount of time towards the gel injections did not help at all.  She states she knows she has a meniscus tear from a MRI from December 2022 but did not have surgery for it and was treating it conservatively. She would like to proceed with surgical intervention.    The patient has crutches and an iceman at home. They will got to [facility] in [place] for physical therapy following surgery.         Current Outpatient Medications:     sertraline (ZOLOFT) 50 MG tablet, Take 1.5 tablets by mouth daily, Disp: 45 tablet, Rfl: 1    hydrOXYzine HCl (ATARAX) 25 MG tablet, Take 1 tablet by mouth nightly as needed for Anxiety (sleep), Disp: 30 tablet, Rfl: 1    ALPRAZolam (XANAX) 1 MG tablet, Take 1 tablet by mouth 2 times daily as needed for Sleep., Disp: , Rfl:     EPINEPHrine (EPIPEN 2-JEANE) 0.3 MG/0.3ML SOAJ injection, Inject 0.3 mLs into the muscle once for 1 dose Use as directed for allergic reaction (Patient not taking: Reported on 10/23/2024), Disp: 0.3 mL, Rfl: 1    cetirizine (ZYRTEC) 10 MG tablet, Take 1 tablet by mouth daily as needed, Disp: , Rfl:     ibuprofen (ADVIL;MOTRIN) 200 MG CAPS, Take 1 capsule by mouth as needed for Pain, Disp: , Rfl:   Allergies   Allergen Reactions    Bee Pollen Other (See Comments)    Bee Venom Swelling     Past Medical History:   Diagnosis Date    Depression 1985    Essential hypertension, benign 2010    no meds    Generalized anxiety disorder     H/O seasonal allergies     History of shingles 2/14/14    PTSD

## 2024-11-18 NOTE — H&P (VIEW-ONLY)
Name: Christine H Abercrombie  YOB: 1970  Gender: female  MRN: [id number]    CC: Knee Pain (L)     HPI: Christine H Abercrombie is a 54 y.o. female who presents today for preoperative evaluation.     She has had left knee pain for years.  She has arthritic changes but her main complaint today is pain when she steps wrong or pivots.  She is failed conservative management of a cortisone injection and gel injections.  She states the cortisone injections helped for a limited amount of time towards the gel injections did not help at all.  She states she knows she has a meniscus tear from a MRI from December 2022 but did not have surgery for it and was treating it conservatively. She would like to proceed with surgical intervention.    The patient has crutches and an iceman at home. They will got to [facility] in [place] for physical therapy following surgery.         Current Outpatient Medications:     sertraline (ZOLOFT) 50 MG tablet, Take 1.5 tablets by mouth daily, Disp: 45 tablet, Rfl: 1    hydrOXYzine HCl (ATARAX) 25 MG tablet, Take 1 tablet by mouth nightly as needed for Anxiety (sleep), Disp: 30 tablet, Rfl: 1    ALPRAZolam (XANAX) 1 MG tablet, Take 1 tablet by mouth 2 times daily as needed for Sleep., Disp: , Rfl:     EPINEPHrine (EPIPEN 2-JEANE) 0.3 MG/0.3ML SOAJ injection, Inject 0.3 mLs into the muscle once for 1 dose Use as directed for allergic reaction (Patient not taking: Reported on 10/23/2024), Disp: 0.3 mL, Rfl: 1    cetirizine (ZYRTEC) 10 MG tablet, Take 1 tablet by mouth daily as needed, Disp: , Rfl:     ibuprofen (ADVIL;MOTRIN) 200 MG CAPS, Take 1 capsule by mouth as needed for Pain, Disp: , Rfl:   Allergies   Allergen Reactions    Bee Pollen Other (See Comments)    Bee Venom Swelling     Past Medical History:   Diagnosis Date    Depression 1985    Essential hypertension, benign 2010    no meds    Generalized anxiety disorder     H/O seasonal allergies     History of shingles 2/14/14    PTSD

## 2024-11-25 ENCOUNTER — CLINICAL DOCUMENTATION (OUTPATIENT)
Dept: ORTHOPEDIC SURGERY | Age: 54
End: 2024-11-25

## 2024-11-25 NOTE — PERIOP NOTE
Patient verified name and .  Order for consent NOT found in EHR at time of PAT visit. Unable to verify case posting against order; surgery verified by patient.    Type 1B surgery, PAT phone assessment complete.  Orders not received.  Labs per surgeon: unknown; no orders received    Labs per anesthesia protocol: none indicated    Patient answered medical/surgical history questions at their best of ability. All prior to admission medications documented in EPIC.    Patient instructed to continue taking all prescription medications up to the day of surgery but to take only the following medications the day of surgery according to anesthesia guidelines with a small sip of water: Xanax (if needed). Also, patient is requested to take 2 Tylenol in the morning and then again before bed on the day before surgery. Regular or extra strength may be used.       Patient informed that all vitamins and supplements should be held 7 days prior to surgery and NSAIDS 5 days prior to surgery. Prescription meds to hold:ibuprofen 5 days    Patient instructed on the following:    > Arrive at [facility] Entrance, time of arrival to be called the day before by 1700  > NPO after midnight, unless otherwise indicated, including gum, mints, and ice chips. Per anesthesiology instructions, please drink 32 oz of water 2 hours prior to arrival to prevent dehydration.  > Responsible adult must drive patient to the hospital, stay during surgery, and patient will need supervision 24 hours after anesthesia  > Use non moisturizing soap in shower the night before surgery and on the morning of surgery  > All piercings must be removed prior to arrival.    > Leave all valuables (money and jewelry) at home but bring insurance card and ID on DOS.   > You may be required to pay a deductible or co-pay on the day of your procedure. You can pre-pay by calling [phone] if your surgery is at the [facility] or [phone] if your surgery is at the [facility].  >

## 2024-12-03 ENCOUNTER — ANESTHESIA EVENT (OUTPATIENT)
Dept: SURGERY | Age: 54
End: 2024-12-03
Payer: COMMERCIAL

## 2024-12-03 NOTE — PERIOP NOTE
Preop department called to notify patient of arrival time for scheduled procedure. Instructions given to   - Arrive at [facility] Entrance [address].  - No solid food after midnight & Please drink 32 ounces of water 2 hours prior to your arrival to avoid dehydration unless otherwise indicated. No gum, mints, or ice chips.   - Have a responsible adult to drive patient to the hospital, stay during surgery, and patient will need supervision 24 hours after anesthesia.   - Use antibacterial soap in shower the night before surgery and on the morning of surgery.       Was patient contacted: yes  Voicemail left: n/a  Numbers contacted: [phone]   Arrival time: 0530  Time to complete 32 ounces of water: 0330

## 2024-12-04 ENCOUNTER — HOSPITAL ENCOUNTER (OUTPATIENT)
Age: 54
Setting detail: OUTPATIENT SURGERY
Discharge: HOME | End: 2024-12-04
Attending: ORTHOPAEDIC SURGERY | Admitting: ORTHOPAEDIC SURGERY
Payer: COMMERCIAL

## 2024-12-04 ENCOUNTER — ANESTHESIA (OUTPATIENT)
Dept: SURGERY | Age: 54
End: 2024-12-04
Payer: COMMERCIAL

## 2024-12-04 VITALS
WEIGHT: 191 LBS | HEIGHT: 64 IN | HEART RATE: 85 BPM | RESPIRATION RATE: 10 BRPM | SYSTOLIC BLOOD PRESSURE: 169 MMHG | BODY MASS INDEX: 32.61 KG/M2 | DIASTOLIC BLOOD PRESSURE: 86 MMHG | OXYGEN SATURATION: 95 % | TEMPERATURE: 97.7 F

## 2024-12-04 PROCEDURE — 3700000001 HC ADD 15 MINUTES (ANESTHESIA): Performed by: ORTHOPAEDIC SURGERY

## 2024-12-04 PROCEDURE — 7100000010 HC PHASE II RECOVERY - FIRST 15 MIN: Performed by: ORTHOPAEDIC SURGERY

## 2024-12-04 PROCEDURE — 2580000003 HC RX 258: Performed by: ANESTHESIOLOGY

## 2024-12-04 PROCEDURE — 6360000002 HC RX W HCPCS: Performed by: NURSE ANESTHETIST, CERTIFIED REGISTERED

## 2024-12-04 PROCEDURE — 6360000002 HC RX W HCPCS: Performed by: ORTHOPAEDIC SURGERY

## 2024-12-04 PROCEDURE — 2580000003 HC RX 258: Performed by: PHYSICIAN ASSISTANT

## 2024-12-04 PROCEDURE — 7100000000 HC PACU RECOVERY - FIRST 15 MIN: Performed by: ORTHOPAEDIC SURGERY

## 2024-12-04 PROCEDURE — 6360000002 HC RX W HCPCS: Performed by: PHYSICIAN ASSISTANT

## 2024-12-04 PROCEDURE — 6370000000 HC RX 637 (ALT 250 FOR IP): Performed by: ANESTHESIOLOGY

## 2024-12-04 PROCEDURE — 3600000004 HC SURGERY LEVEL 4 BASE: Performed by: ORTHOPAEDIC SURGERY

## 2024-12-04 PROCEDURE — 7100000001 HC PACU RECOVERY - ADDTL 15 MIN: Performed by: ORTHOPAEDIC SURGERY

## 2024-12-04 PROCEDURE — 2500000003 HC RX 250 WO HCPCS: Performed by: NURSE ANESTHETIST, CERTIFIED REGISTERED

## 2024-12-04 PROCEDURE — 2709999900 HC NON-CHARGEABLE SUPPLY: Performed by: ORTHOPAEDIC SURGERY

## 2024-12-04 PROCEDURE — 3600000014 HC SURGERY LEVEL 4 ADDTL 15MIN: Performed by: ORTHOPAEDIC SURGERY

## 2024-12-04 PROCEDURE — 3700000000 HC ANESTHESIA ATTENDED CARE: Performed by: ORTHOPAEDIC SURGERY

## 2024-12-04 RX ORDER — DEXTROSE MONOHYDRATE 100 MG/ML
INJECTION, SOLUTION INTRAVENOUS CONTINUOUS PRN
Status: DISCONTINUED | OUTPATIENT
Start: 2024-12-04 | End: 2024-12-04 | Stop reason: HOSPADM

## 2024-12-04 RX ORDER — FENTANYL CITRATE 50 UG/ML
INJECTION, SOLUTION INTRAMUSCULAR; INTRAVENOUS
Status: DISCONTINUED | OUTPATIENT
Start: 2024-12-04 | End: 2024-12-04 | Stop reason: SDUPTHER

## 2024-12-04 RX ORDER — SODIUM CHLORIDE 9 MG/ML
INJECTION, SOLUTION INTRAVENOUS CONTINUOUS
Status: DISCONTINUED | OUTPATIENT
Start: 2024-12-04 | End: 2024-12-04 | Stop reason: HOSPADM

## 2024-12-04 RX ORDER — ACETAMINOPHEN 500 MG
1000 TABLET ORAL ONCE
Status: COMPLETED | OUTPATIENT
Start: 2024-12-04 | End: 2024-12-04

## 2024-12-04 RX ORDER — SODIUM CHLORIDE 0.9 % (FLUSH) 0.9 %
5-40 SYRINGE (ML) INJECTION EVERY 12 HOURS SCHEDULED
Status: DISCONTINUED | OUTPATIENT
Start: 2024-12-04 | End: 2024-12-04 | Stop reason: HOSPADM

## 2024-12-04 RX ORDER — SODIUM CHLORIDE, SODIUM LACTATE, POTASSIUM CHLORIDE, CALCIUM CHLORIDE 600; 310; 30; 20 MG/100ML; MG/100ML; MG/100ML; MG/100ML
INJECTION, SOLUTION INTRAVENOUS CONTINUOUS
Status: DISCONTINUED | OUTPATIENT
Start: 2024-12-04 | End: 2024-12-04 | Stop reason: HOSPADM

## 2024-12-04 RX ORDER — PROCHLORPERAZINE EDISYLATE 5 MG/ML
5 INJECTION INTRAMUSCULAR; INTRAVENOUS
Status: DISCONTINUED | OUTPATIENT
Start: 2024-12-04 | End: 2024-12-04 | Stop reason: HOSPADM

## 2024-12-04 RX ORDER — NALOXONE HYDROCHLORIDE 0.4 MG/ML
INJECTION, SOLUTION INTRAMUSCULAR; INTRAVENOUS; SUBCUTANEOUS PRN
Status: DISCONTINUED | OUTPATIENT
Start: 2024-12-04 | End: 2024-12-04 | Stop reason: HOSPADM

## 2024-12-04 RX ORDER — KETAMINE HCL IN NACL, ISO-OSM 20 MG/2 ML
SYRINGE (ML) INJECTION
Status: DISCONTINUED | OUTPATIENT
Start: 2024-12-04 | End: 2024-12-04 | Stop reason: SDUPTHER

## 2024-12-04 RX ORDER — LIDOCAINE HYDROCHLORIDE 10 MG/ML
1 INJECTION, SOLUTION INFILTRATION; PERINEURAL
Status: DISCONTINUED | OUTPATIENT
Start: 2024-12-04 | End: 2024-12-04 | Stop reason: HOSPADM

## 2024-12-04 RX ORDER — ONDANSETRON 2 MG/ML
INJECTION INTRAMUSCULAR; INTRAVENOUS
Status: DISCONTINUED | OUTPATIENT
Start: 2024-12-04 | End: 2024-12-04 | Stop reason: SDUPTHER

## 2024-12-04 RX ORDER — FENTANYL CITRATE 50 UG/ML
100 INJECTION, SOLUTION INTRAMUSCULAR; INTRAVENOUS
Status: DISCONTINUED | OUTPATIENT
Start: 2024-12-04 | End: 2024-12-04 | Stop reason: HOSPADM

## 2024-12-04 RX ORDER — OXYCODONE HYDROCHLORIDE 5 MG/1
5 TABLET ORAL
Status: COMPLETED | OUTPATIENT
Start: 2024-12-04 | End: 2024-12-04

## 2024-12-04 RX ORDER — DIPHENHYDRAMINE HYDROCHLORIDE 50 MG/ML
12.5 INJECTION INTRAMUSCULAR; INTRAVENOUS
Status: DISCONTINUED | OUTPATIENT
Start: 2024-12-04 | End: 2024-12-04 | Stop reason: HOSPADM

## 2024-12-04 RX ORDER — SODIUM CHLORIDE 0.9 % (FLUSH) 0.9 %
5-40 SYRINGE (ML) INJECTION PRN
Status: DISCONTINUED | OUTPATIENT
Start: 2024-12-04 | End: 2024-12-04 | Stop reason: HOSPADM

## 2024-12-04 RX ORDER — IBUPROFEN 600 MG/1
1 TABLET ORAL PRN
Status: DISCONTINUED | OUTPATIENT
Start: 2024-12-04 | End: 2024-12-04 | Stop reason: HOSPADM

## 2024-12-04 RX ORDER — PROPOFOL 10 MG/ML
INJECTION, EMULSION INTRAVENOUS
Status: DISCONTINUED | OUTPATIENT
Start: 2024-12-04 | End: 2024-12-04 | Stop reason: SDUPTHER

## 2024-12-04 RX ORDER — SODIUM CHLORIDE 9 MG/ML
INJECTION, SOLUTION INTRAVENOUS PRN
Status: DISCONTINUED | OUTPATIENT
Start: 2024-12-04 | End: 2024-12-04 | Stop reason: HOSPADM

## 2024-12-04 RX ORDER — MIDAZOLAM HYDROCHLORIDE 2 MG/2ML
2 INJECTION, SOLUTION INTRAMUSCULAR; INTRAVENOUS
Status: DISCONTINUED | OUTPATIENT
Start: 2024-12-04 | End: 2024-12-04 | Stop reason: HOSPADM

## 2024-12-04 RX ORDER — LIDOCAINE HYDROCHLORIDE 20 MG/ML
INJECTION, SOLUTION EPIDURAL; INFILTRATION; INTRACAUDAL; PERINEURAL
Status: DISCONTINUED | OUTPATIENT
Start: 2024-12-04 | End: 2024-12-04 | Stop reason: SDUPTHER

## 2024-12-04 RX ORDER — ROPIVACAINE HYDROCHLORIDE 5 MG/ML
INJECTION, SOLUTION EPIDURAL; INFILTRATION; PERINEURAL PRN
Status: DISCONTINUED | OUTPATIENT
Start: 2024-12-04 | End: 2024-12-04 | Stop reason: ALTCHOICE

## 2024-12-04 RX ORDER — DEXAMETHASONE SODIUM PHOSPHATE 10 MG/ML
INJECTION INTRAMUSCULAR; INTRAVENOUS
Status: DISCONTINUED | OUTPATIENT
Start: 2024-12-04 | End: 2024-12-04 | Stop reason: SDUPTHER

## 2024-12-04 RX ORDER — EPHEDRINE SULFATE 5 MG/ML
INJECTION INTRAVENOUS
Status: DISCONTINUED | OUTPATIENT
Start: 2024-12-04 | End: 2024-12-04 | Stop reason: SDUPTHER

## 2024-12-04 RX ORDER — HYDROMORPHONE HYDROCHLORIDE 2 MG/ML
0.5 INJECTION, SOLUTION INTRAMUSCULAR; INTRAVENOUS; SUBCUTANEOUS EVERY 10 MIN PRN
Status: DISCONTINUED | OUTPATIENT
Start: 2024-12-04 | End: 2024-12-04 | Stop reason: HOSPADM

## 2024-12-04 RX ADMIN — ACETAMINOPHEN 1000 MG: 500 TABLET, FILM COATED ORAL at 05:51

## 2024-12-04 RX ADMIN — SODIUM CHLORIDE, SODIUM LACTATE, POTASSIUM CHLORIDE, AND CALCIUM CHLORIDE: 600; 310; 30; 20 INJECTION, SOLUTION INTRAVENOUS at 07:55

## 2024-12-04 RX ADMIN — Medication 20 MG: at 08:00

## 2024-12-04 RX ADMIN — DEXAMETHASONE SODIUM PHOSPHATE 10 MG: 10 INJECTION INTRAMUSCULAR; INTRAVENOUS at 08:02

## 2024-12-04 RX ADMIN — FENTANYL CITRATE 100 MCG: 50 INJECTION, SOLUTION INTRAMUSCULAR; INTRAVENOUS at 08:04

## 2024-12-04 RX ADMIN — CEFAZOLIN 2000 MG: 2 INJECTION, POWDER, FOR SOLUTION INTRAMUSCULAR; INTRAVENOUS at 08:00

## 2024-12-04 RX ADMIN — OXYCODONE 5 MG: 5 TABLET ORAL at 08:58

## 2024-12-04 RX ADMIN — ONDANSETRON 4 MG: 2 INJECTION INTRAMUSCULAR; INTRAVENOUS at 08:02

## 2024-12-04 RX ADMIN — LIDOCAINE HYDROCHLORIDE 100 MG: 20 INJECTION, SOLUTION EPIDURAL; INFILTRATION; INTRACAUDAL; PERINEURAL at 07:57

## 2024-12-04 RX ADMIN — PROPOFOL 200 MG: 10 INJECTION, EMULSION INTRAVENOUS at 07:57

## 2024-12-04 RX ADMIN — EPHEDRINE SULFATE 10 MG: 5 INJECTION INTRAVENOUS at 08:10

## 2024-12-04 ASSESSMENT — PAIN SCALES - GENERAL
PAINLEVEL_OUTOF10: 4
PAINLEVEL_OUTOF10: 0

## 2024-12-04 NOTE — INTERVAL H&P NOTE
Update History & Physical    The Patient's History and Physical was reviewed   I discussed the surgery and patients medical condition with the patient.  The chart was reviewed with the patient and I examined the patient.    There was no change from previous note.  The surgical site was confirmed by the patient and me.    CV: RRR  RESP: CTAB    Plan:  The risk, benefits, expected outcome, and alternative to the recommended procedure have been discussed with the patient.  Patient understands and elects to proceed with the procedure as planned.    Electronically signed by Jacob Munoz MD on 12/04/24 7:46 AM

## 2024-12-04 NOTE — DISCHARGE INSTRUCTIONS
Post-op instructions for Knee Arthroscopy (Dr. Munoz)    Day of Surgery:     DIET:   Begin with liquids and light foods (jell-o, soup, etc.). Progress to your normal diet if you are not nauseated.    MEDICATION:   1. Pain- Norco (hydrocodone/acetaminophen) or Oxycodone. If you are taking oxycodone, you may also take two (2) Tylenol (acetaminophen) 500mg tabs every eight (8) hours. Do not take Tylenol (acetaminophen) if you are given Norco as this medicine already contains acetaminophen. Do not drink alcohol while taking pain medication. Slowly wean off the pain medication as the pain improves.   2. Aspirin 81 mg: Take one (1) tablet in the morning and at night each day x 2 weeks post-operatively.  3. Stool Softener-Pain medication can cause constipation. Be sure to drink plenty of water and take an over the counter stool softener as needed.     ICE:  Do NOT put the ice machine directly on your skin. Use it frequently for the first 72 hours. You may also use a regular ice bag/pack for 20 minutes at a time. Place a thin layer of clothing or pillow case between ice pack and your skin. Ice for 20-30 minutes on and then 20-30 minutes off.  If you have the Surgical dressing intact you may run your ice machine for as long as as comfortable as long as your skin is not irritated/burned.     SHOWERING:  No showering. Leave bandages in place.     ACTIVITY:  Keep leg elevated on a pillow placed under your ankle.   **DO NOT PUT A PILLOW UNDER YOUR KNEE!!**  It is important for you to keep your leg straight. Use crutches and you may put light weight on the operative leg.    EXERCISES:  Begin ankle pumps.   Attempt quadriceps sets and straight leg raises    First & Second Post-Op Day:    MEDICATION: Continue as instructed above.    BANDAGES: No showering. Keep bandage in place.     EXERCISES: Continue Quad sets and ankle pumps as above. Bend and extend the knee as tolerated. You may put light weight on the leg. Continue to

## 2024-12-04 NOTE — ANESTHESIA PRE PROCEDURE
Department of Anesthesiology  Preprocedure Note       Name:  Christine Heather Abercrombie   Age:  54 y.o.  :  1970                                          MRN:  [id number]         Date:  2024      Surgeon: Surgeon(s):  Jacob Munoz MD    Procedure: Procedure(s):  LEFT KNEE  ARTHROSCOPY MENISCECTOMY       SUPINE    Medications prior to admission:   Prior to Admission medications    Medication Sig Start Date End Date Taking? Authorizing Provider   sertraline (ZOLOFT) 50 MG tablet Take 1.5 tablets by mouth daily  Patient taking differently: Take 1.5 tablets by mouth nightly 10/23/24  Yes Mane Judd MD   hydrOXYzine HCl (ATARAX) 25 MG tablet Take 1 tablet by mouth nightly as needed for Anxiety (sleep) 10/23/24  Yes Mane Judd MD   ALPRAZolam (XANAX) 1 MG tablet Take 1 tablet by mouth 2 times daily as needed for Sleep.   Yes Provider, MD Monika   cetirizine (ZYRTEC) 10 MG tablet Take 1 tablet by mouth nightly   Yes Automatic Reconciliation, Ar   ondansetron (ZOFRAN-ODT) 8 MG TBDP disintegrating tablet Take 0.5 tablets by mouth every 6 hours Take 20 minutes prior to pain medication for nausea prevention  Patient not taking: Reported on 2024   Ketty Dimas PA   EPINEPHrine (EPIPEN 2-JEANE) 0.3 MG/0.3ML SOAJ injection Inject 0.3 mLs into the muscle once for 1 dose Use as directed for allergic reaction  Patient not taking: Reported on 10/23/2024 10/4/23 10/4/24  Leonor Mascorro MD   ibuprofen (ADVIL;MOTRIN) 200 MG CAPS Take 1 capsule by mouth as needed for Pain  Patient not taking: Reported on 2024    Automatic Reconciliation, Ar       Current medications:    Current Facility-Administered Medications   Medication Dose Route Frequency Provider Last Rate Last Admin    sodium chloride flush 0.9 % injection 5-40 mL  5-40 mL IntraVENous 2 times per day Ketty Dimas PA        sodium chloride flush 0.9 % injection 5-40 mL  5-40 mL IntraVENous PRN Wing

## 2024-12-04 NOTE — ANESTHESIA POSTPROCEDURE EVALUATION
Department of Anesthesiology  Postprocedure Note    Patient: Christine Heather Abercrombie  MRN: [id number]  YOB: 1970  Date of evaluation: 12/4/2024    Procedure Summary       Date: 12/04/24 Room / Location: [facility] OP OR 06 / [facility]    Anesthesia Start: 0755 Anesthesia Stop: 0840    Procedure: LEFT KNEE  ARTHROSCOPY MEDIAL AND LATERAL MENISCECTOMY, ABRASION ARTHROPLASTY MEDIAL FEMORAL CONDYLE (Left: Knee) Diagnosis:       Chronic pain of left knee      Primary osteoarthritis of left knee      Old complex tear of medial meniscus of left knee      Complex tear of lateral meniscus of left knee as current injury, subsequent encounter      Instability of knee joint, left      (Chronic pain of left knee [M25.562, G89.29])      (Primary osteoarthritis of left knee [M17.12])      (Old complex tear of medial meniscus of left knee [M23.204])      (Complex tear of lateral meniscus of left knee as current injury, subsequent encounter [S83.272D])      (Instability of knee joint, left [M25.362])    Surgeons: Jacob Munoz MD Responsible Provider: Shai Perez MD    Anesthesia Type: General ASA Status: 3            Anesthesia Type: General    Niya Phase I: Niya Score: 6    Niya Phase II: Niya Score: 10    Anesthesia Post Evaluation    Patient location during evaluation: PACU  Patient participation: complete - patient participated  Level of consciousness: awake and alert  Airway patency: patent  Nausea: well controlled.  Cardiovascular status: acceptable.  Respiratory status: acceptable  Hydration status: stable  Pain management: adequate    No notable events documented.

## 2024-12-04 NOTE — OP NOTE
chronically torn and was not functional but I did not feel it was amenable to repair.    Tourniquet was let down the knee was drained the portals were closed with buried Monocryl suture and Steri-Strips.  Local anesthetic with 0.5% Naropin was injected into incision sites and a small amount intra-articularly for post op pain control.Sterile dressings were applied.  Kalin tolerated the procedure well was awakened and transferred to the PACU in stable condition        Postoperative plan:  1) Discharge Home  2) DVT prophylaxis with aspirin 81 mg twice daily x 2 weeks  3) Rehab protocol: Routine knee arthroscopy protocol with range of motion as tolerated and gradual progression of weightbearing as tolerated     Signed By:  Jacob Munoz MD     December 4, 2024

## 2024-12-06 ENCOUNTER — CLINICAL DOCUMENTATION (OUTPATIENT)
Dept: ORTHOPEDIC SURGERY | Age: 54
End: 2024-12-06

## 2024-12-11 ENCOUNTER — CLINICAL DOCUMENTATION (OUTPATIENT)
Dept: ORTHOPEDIC SURGERY | Age: 54
End: 2024-12-11

## 2024-12-13 ENCOUNTER — CLINICAL DOCUMENTATION (OUTPATIENT)
Dept: ORTHOPEDIC SURGERY | Age: 54
End: 2024-12-13

## 2024-12-13 ENCOUNTER — TELEPHONE (OUTPATIENT)
Dept: ORTHOPEDIC SURGERY | Age: 54
End: 2024-12-13

## 2024-12-13 DIAGNOSIS — S83.272A COMPLEX TEAR OF LATERAL MENISCUS OF LEFT KNEE AS CURRENT INJURY, INITIAL ENCOUNTER: Primary | ICD-10-CM

## 2024-12-13 RX ORDER — HYDROCODONE BITARTRATE AND ACETAMINOPHEN 5; 325 MG/1; MG/1
1 TABLET ORAL EVERY 4 HOURS PRN
Qty: 30 TABLET | Refills: 0 | Status: SHIPPED | OUTPATIENT
Start: 2024-12-13 | End: 2024-12-18

## 2024-12-13 NOTE — PROGRESS NOTES
Called Ms. Abercrombie this afternoon.  I will send in some more Norco fives for her for help with the pain after physical therapy and at nighttime.  We discussed taking 1000 mg of Tylenol twice a day as needed for pain during the day.  We discussed the importance of weaning off of this medicine.

## 2024-12-16 ENCOUNTER — OFFICE VISIT (OUTPATIENT)
Dept: ORTHOPEDIC SURGERY | Age: 54
End: 2024-12-16

## 2024-12-16 DIAGNOSIS — S83.272A COMPLEX TEAR OF LATERAL MENISCUS OF LEFT KNEE AS CURRENT INJURY, INITIAL ENCOUNTER: ICD-10-CM

## 2024-12-16 DIAGNOSIS — Z09 STATUS POST ORTHOPEDIC SURGERY, FOLLOW-UP EXAM: Primary | ICD-10-CM

## 2024-12-16 PROCEDURE — 99024 POSTOP FOLLOW-UP VISIT: CPT | Performed by: PHYSICIAN ASSISTANT

## 2024-12-16 NOTE — PROGRESS NOTES
Name: Christine Heather Abercrombie  YOB: 1970  Gender: female  MRN: [id number]    Procedure Performed:   1) left  Knee arthroscopy with partial medial and lateral meniscectomy/debridement  2) left knee arthroscopy with abrasion arthroplasty medial femoral condyle and tibial plateau    Date of Procedure: 12/4/24    Subjective: Returns 2 weeks status post the above procedure.  States pain is well-controlled at this point.  She continues take the Norco for before physical therapy and sleep    Physical Examination: Incisions clean and dry, no sign of infection. Media portal intact. Lateral portal has minimal bleeding.  Motor and sensory intact.  Distal wrist pulse 2+. Full extension with 100 degrees of flexion today    Assessment:   1. Status post orthopedic surgery, follow-up exam    2. Complex tear of lateral meniscus of left knee as current injury, initial encounter         Plan:   Doing well.   Continue PT per Routine knee arthroscopy protocol with range of motion as tolerated and gradual progression of weightbearing as tolerated   Follow up in 4 weeks      Ketty Dimas PA-C  Orthopaedics and Sports Medicine

## 2024-12-17 ENCOUNTER — TELEPHONE (OUTPATIENT)
Dept: ORTHOPEDIC SURGERY | Age: 54
End: 2024-12-17

## 2024-12-18 ENCOUNTER — TELEPHONE (OUTPATIENT)
Dept: ORTHOPEDIC SURGERY | Age: 54
End: 2024-12-18

## 2024-12-18 NOTE — TELEPHONE ENCOUNTER
[facility] is needing a copy of her office visit this week. Fax # [phone]. [id number] is her incident number.

## 2024-12-19 ENCOUNTER — CLINICAL DOCUMENTATION (OUTPATIENT)
Dept: ORTHOPEDIC SURGERY | Age: 54
End: 2024-12-19

## 2024-12-19 ENCOUNTER — OFFICE VISIT (OUTPATIENT)
Dept: BEHAVIORAL/MENTAL HEALTH CLINIC | Facility: CLINIC | Age: 54
End: 2024-12-19

## 2024-12-19 VITALS — WEIGHT: 191 LBS | HEIGHT: 64 IN | BODY MASS INDEX: 32.61 KG/M2

## 2024-12-19 DIAGNOSIS — G47.00 INSOMNIA, UNSPECIFIED TYPE: ICD-10-CM

## 2024-12-19 DIAGNOSIS — F43.10 PTSD (POST-TRAUMATIC STRESS DISORDER): ICD-10-CM

## 2024-12-19 DIAGNOSIS — F43.23 ADJUSTMENT DISORDER WITH MIXED ANXIETY AND DEPRESSED MOOD: ICD-10-CM

## 2024-12-19 DIAGNOSIS — F33.1 MAJOR DEPRESSIVE DISORDER, RECURRENT, MODERATE (HCC): Primary | ICD-10-CM

## 2024-12-19 DIAGNOSIS — F41.9 ANXIETY DISORDER, UNSPECIFIED TYPE: ICD-10-CM

## 2024-12-19 RX ORDER — HYDROXYZINE HYDROCHLORIDE 25 MG/1
25 TABLET, FILM COATED ORAL NIGHTLY PRN
Qty: 30 TABLET | Refills: 1 | Status: SHIPPED | OUTPATIENT
Start: 2024-12-19

## 2024-12-19 NOTE — PROGRESS NOTES
Negative for myalgias.    Current Active Problems:   Patient Active Problem List   Diagnosis    Carpal tunnel syndrome, bilateral    PTSD (post-traumatic stress disorder)    Obesity (BMI 30-39.9)    Abdominal pain    Essential hypertension, benign    H/O seasonal allergies    Hypersomnia    Generalized anxiety disorder    CHRIS (obstructive sleep apnea)    Chronic pain of left knee    Primary osteoarthritis of left knee    Old complex tear of medial meniscus of left knee    Complex tear of lateral meniscus of left knee as current injury    Instability of knee joint, left    Elevated hemoglobin A1c    Pure hypercholesterolemia     Allergies:   Allergies   Allergen Reactions    Bee Pollen Other (See Comments)    Bee Venom Swelling    Poison Ivy Extract Rash    Poison Oak Extract Rash     Medications:   Current Outpatient Medications   Medication Sig Dispense Refill    hydrOXYzine HCl (ATARAX) 25 MG tablet Take 1 tablet by mouth nightly as needed for Anxiety (sleep) 30 tablet 1    sertraline (ZOLOFT) 50 MG tablet Take 1.5 tablets by mouth nightly 45 tablet 1    ALPRAZolam (XANAX) 1 MG tablet Take 1 tablet by mouth 2 times daily as needed for Sleep.      ondansetron (ZOFRAN-ODT) 8 MG TBDP disintegrating tablet Take 0.5 tablets by mouth every 6 hours Take 20 minutes prior to pain medication for nausea prevention 16 tablet 0    EPINEPHrine (EPIPEN 2-JEANE) 0.3 MG/0.3ML SOAJ injection Inject 0.3 mLs into the muscle once for 1 dose Use as directed for allergic reaction (Patient not taking: Reported on 10/23/2024) 0.3 mL 1    cetirizine (ZYRTEC) 10 MG tablet Take 1 tablet by mouth nightly      ibuprofen (ADVIL;MOTRIN) 200 MG CAPS Take 1 capsule by mouth as needed for Pain       No current facility-administered medications for this visit.       Reviewed and updated this visit by provider:  Tobacco  Allergies  Meds  Problems  Med Hx  Surg Hx  Fam Hx       OBJECTIVE EXAMINATION:  There were no vitals filed for this

## 2024-12-30 ENCOUNTER — TELEPHONE (OUTPATIENT)
Dept: INTERNAL MEDICINE CLINIC | Facility: CLINIC | Age: 54
End: 2024-12-30

## 2024-12-30 NOTE — TELEPHONE ENCOUNTER
----- Message from Dr. Day Reed DO sent at 12/26/2024 10:29 AM EST -----  Please inform that mammogram was negative, repeat in one year.

## 2025-01-08 NOTE — PROGRESS NOTES
Name: Christine Heather Abercrombie  YOB: 1970  Gender: female  MRN: [id number]      Procedure: Left Knee  Arthroscopy Medial And Lateral Meniscectomy, Abrasion Arthroplasty Medial Femoral Condyle - Left    Surgery Date: 12/4/2024          Subjective: Returns 6 weeks status post knee arthroscopy.  Pain is controlled improving with motion.  Improving with strength still has some discomfort when she is going downstairs but this is improving      Physical Examination: Incisions are well healed.  Good quad activation still some mild atrophy.  Passive extension to about 3 degrees of hyperextension flexion to 130 degrees.  Patella is mobile.  No significant effusion.  Motor and sensory intact.           Assessment:   1. Status post orthopedic surgery, follow-up exam           Plan:     Doing very well.  Continue to progress activities as tolerated.  Continue physical therapy with transition to home exercise program as they see appropriate I think her symptoms will improve as her quad strength improves.  She can return to work as scheduled with light duty and then full duty at the end of February as I do think she will require further strengthening as she has a very physical job.  Follow up: As needed

## 2025-01-09 ENCOUNTER — CLINICAL DOCUMENTATION (OUTPATIENT)
Dept: ORTHOPEDIC SURGERY | Age: 55
End: 2025-01-09

## 2025-01-13 ENCOUNTER — CLINICAL DOCUMENTATION (OUTPATIENT)
Dept: ORTHOPEDIC SURGERY | Age: 55
End: 2025-01-13

## 2025-01-13 ENCOUNTER — OFFICE VISIT (OUTPATIENT)
Dept: ORTHOPEDIC SURGERY | Age: 55
End: 2025-01-13

## 2025-01-13 DIAGNOSIS — Z09 STATUS POST ORTHOPEDIC SURGERY, FOLLOW-UP EXAM: Primary | ICD-10-CM

## 2025-01-13 PROCEDURE — 99024 POSTOP FOLLOW-UP VISIT: CPT | Performed by: ORTHOPAEDIC SURGERY

## 2025-01-16 ENCOUNTER — CLINICAL DOCUMENTATION (OUTPATIENT)
Dept: ORTHOPEDIC SURGERY | Age: 55
End: 2025-01-16

## 2025-02-13 ENCOUNTER — CLINICAL DOCUMENTATION (OUTPATIENT)
Dept: ORTHOPEDIC SURGERY | Age: 55
End: 2025-02-13

## 2025-02-13 NOTE — PROGRESS NOTES
Patient requested last [facility] work note form to plant nurse Kaylie fax [phone].

## 2025-02-19 ENCOUNTER — OFFICE VISIT (OUTPATIENT)
Dept: BEHAVIORAL/MENTAL HEALTH CLINIC | Facility: CLINIC | Age: 55
End: 2025-02-19

## 2025-02-19 VITALS — BODY MASS INDEX: 33.64 KG/M2 | WEIGHT: 196 LBS

## 2025-02-19 DIAGNOSIS — F41.9 ANXIETY DISORDER, UNSPECIFIED TYPE: ICD-10-CM

## 2025-02-19 DIAGNOSIS — F43.10 PTSD (POST-TRAUMATIC STRESS DISORDER): ICD-10-CM

## 2025-02-19 DIAGNOSIS — F43.23 ADJUSTMENT DISORDER WITH MIXED ANXIETY AND DEPRESSED MOOD: Primary | ICD-10-CM

## 2025-02-19 DIAGNOSIS — F33.1 MAJOR DEPRESSIVE DISORDER, RECURRENT, MODERATE (HCC): ICD-10-CM

## 2025-02-19 DIAGNOSIS — G47.00 INSOMNIA, UNSPECIFIED TYPE: ICD-10-CM

## 2025-02-19 RX ORDER — ALPRAZOLAM 0.5 MG
0.5 TABLET ORAL DAILY PRN
Qty: 30 TABLET | Refills: 1 | Status: SHIPPED | OUTPATIENT
Start: 2025-02-19 | End: 2025-04-20

## 2025-02-19 RX ORDER — HYDROXYZINE HYDROCHLORIDE 25 MG/1
25 TABLET, FILM COATED ORAL NIGHTLY PRN
Qty: 30 TABLET | Refills: 1 | Status: SHIPPED | OUTPATIENT
Start: 2025-02-19

## 2025-02-19 NOTE — PROGRESS NOTES
stress disorder)  sertraline (ZOLOFT) 50 MG tablet      Psychotherapy:  Does not participate in therapy at this time  Supportive therapy:  Noted psychological stressors are entailed above in interval history of note.  Techniques applied: genuineness, explanation, encouragement, advice, positive reframing, ego-lending  Goals: improved acceptance, understanding; improved decision making and coping skills  Progress is continuous and prognosis is fair.  Greater than 16 minutes were spent providing behavior modifying strategies using supportive therapy, reflexive and empathic listening, and psychoeducation related to psychosocial stressors.  Pertinent labs/imaging:  Lab Results   Component Value Date    TRIG 168 (H) 09/24/2024    HDL 48 09/24/2024     (H) 09/24/2024    CHOL 204 (H) 09/24/2024    GLUCOSE 110 (H) 09/24/2024     Lab Results   Component Value Date    TSH 1.050 08/05/2021     Questionnaires:   PHQ:      10/23/2024     9:45 AM 7/23/2024    11:02 AM 5/15/2024     1:37 PM   PHQ-9    Little interest or pleasure in doing things 3 0 0   Feeling down, depressed, or hopeless 3 1 2   Trouble falling or staying asleep, or sleeping too much 2 0 3   Feeling tired or having little energy 3 0 3   Poor appetite or overeating 1 3 3   Feeling bad about yourself - or that you are a failure or have let yourself or your family down 3 3 3   Trouble concentrating on things, such as reading the newspaper or watching television 1 0 3   Moving or speaking so slowly that other people could have noticed. Or the opposite - being so fidgety or restless that you have been moving around a lot more than usual 0 0 0   Thoughts that you would be better off dead, or of hurting yourself in some way 0 0 0   PHQ-2 Score 6 1 2   PHQ-9 Total Score 16 7 17   If you checked off any problems, how difficult have these problems made it for you to do your work, take care of things at home, or get along with other people? 0 2 2      GAD7:

## 2025-03-10 ENCOUNTER — APPOINTMENT (OUTPATIENT)
Dept: URBAN - METROPOLITAN AREA CLINIC 329 | Facility: CLINIC | Age: 55
Setting detail: DERMATOLOGY
End: 2025-03-10

## 2025-03-10 DIAGNOSIS — L81.4 OTHER MELANIN HYPERPIGMENTATION: ICD-10-CM | Status: STABLE

## 2025-03-10 DIAGNOSIS — D18.0 HEMANGIOMA: ICD-10-CM | Status: UNCHANGED

## 2025-03-10 DIAGNOSIS — L91.8 OTHER HYPERTROPHIC DISORDERS OF THE SKIN: ICD-10-CM

## 2025-03-10 DIAGNOSIS — L82.1 OTHER SEBORRHEIC KERATOSIS: ICD-10-CM | Status: UNCHANGED

## 2025-03-10 DIAGNOSIS — D22 MELANOCYTIC NEVI: ICD-10-CM | Status: STABLE

## 2025-03-10 PROBLEM — D18.01 HEMANGIOMA OF SKIN AND SUBCUTANEOUS TISSUE: Status: ACTIVE | Noted: 2025-03-10

## 2025-03-10 PROBLEM — D22.5 MELANOCYTIC NEVI OF TRUNK: Status: ACTIVE | Noted: 2025-03-10

## 2025-03-10 PROCEDURE — ? SKIN TAG REMOVAL (COSMETIC)

## 2025-03-10 PROCEDURE — ? FULL BODY SKIN EXAM

## 2025-03-10 PROCEDURE — 99213 OFFICE O/P EST LOW 20 MIN: CPT

## 2025-03-10 PROCEDURE — ? COUNSELING

## 2025-03-10 ASSESSMENT — LOCATION DETAILED DESCRIPTION DERM
LOCATION DETAILED: INFERIOR THORACIC SPINE
LOCATION DETAILED: RIGHT INFERIOR ANTERIOR NECK
LOCATION DETAILED: LEFT INFERIOR UPPER BACK
LOCATION DETAILED: RIGHT CLAVICULAR NECK
LOCATION DETAILED: LEFT MEDIAL UPPER BACK
LOCATION DETAILED: RIGHT INFERIOR LATERAL NECK
LOCATION DETAILED: RIGHT MEDIAL UPPER BACK

## 2025-03-10 ASSESSMENT — LOCATION ZONE DERM
LOCATION ZONE: NECK
LOCATION ZONE: TRUNK

## 2025-03-10 ASSESSMENT — LOCATION SIMPLE DESCRIPTION DERM
LOCATION SIMPLE: UPPER BACK
LOCATION SIMPLE: RIGHT UPPER BACK
LOCATION SIMPLE: LEFT UPPER BACK
LOCATION SIMPLE: RIGHT ANTERIOR NECK

## 2025-03-10 NOTE — PROCEDURE: SKIN TAG REMOVAL (COSMETIC)
VITAL SIGNS: I have reviewed nursing notes and confirm.  CONSTITUTIONAL: Well-developed; well-nourished; in no acute distress.  SKIN: Agree with RN documentation regarding decubitus evaluation. Remainder of skin exam is warm and dry, no acute rash.  HEAD: Normocephalic; atraumatic.  EYES: PERRL, EOM intact; conjunctiva and sclera clear.  ENT: No nasal discharge; airway clear.  NECK: Supple; non tender.  CARD: S1, S2 normal; no murmurs, gallops, or rubs. RRR  RESP: No wheezes, rales or rhonchi. CTA w/good excursion  ABD: Normal bowel sounds; soft; non-distended; non-tender.  EXT: Normal ROM. No clubbing, cyanosis or edema.  LYMPH: No acute cervical adenopathy.  NEURO: Alert, oriented. Grossly unremarkable.  PSYCH: Cooperative, appropriate.
Detail Level: Detailed
Price (Use Numbers Only, No Special Characters Or $): 40
Consent: Written consent obtained and the risks of skin tag removal was reviewed with the patient including but not limited to bleeding, pigmentary change, infection, pain, and remote possibility of scarring.
Removed With: liquid nitrogen

## 2025-04-07 ENCOUNTER — OFFICE VISIT (OUTPATIENT)
Dept: BEHAVIORAL/MENTAL HEALTH CLINIC | Facility: CLINIC | Age: 55
End: 2025-04-07
Payer: COMMERCIAL

## 2025-04-07 VITALS
OXYGEN SATURATION: 98 % | WEIGHT: 194.6 LBS | SYSTOLIC BLOOD PRESSURE: 154 MMHG | DIASTOLIC BLOOD PRESSURE: 90 MMHG | HEIGHT: 64 IN | HEART RATE: 92 BPM | BODY MASS INDEX: 33.22 KG/M2

## 2025-04-07 DIAGNOSIS — F33.1 MAJOR DEPRESSIVE DISORDER, RECURRENT, MODERATE (HCC): Primary | ICD-10-CM

## 2025-04-07 DIAGNOSIS — F43.23 ADJUSTMENT DISORDER WITH MIXED ANXIETY AND DEPRESSED MOOD: ICD-10-CM

## 2025-04-07 DIAGNOSIS — F41.9 ANXIETY DISORDER, UNSPECIFIED TYPE: ICD-10-CM

## 2025-04-07 DIAGNOSIS — F43.10 PTSD (POST-TRAUMATIC STRESS DISORDER): ICD-10-CM

## 2025-04-07 DIAGNOSIS — G47.00 INSOMNIA, UNSPECIFIED TYPE: ICD-10-CM

## 2025-04-07 PROCEDURE — 99214 OFFICE O/P EST MOD 30 MIN: CPT | Performed by: PSYCHIATRY & NEUROLOGY

## 2025-04-07 PROCEDURE — 3080F DIAST BP >= 90 MM HG: CPT | Performed by: PSYCHIATRY & NEUROLOGY

## 2025-04-07 PROCEDURE — 3077F SYST BP >= 140 MM HG: CPT | Performed by: PSYCHIATRY & NEUROLOGY

## 2025-04-07 RX ORDER — HYDROXYZINE HYDROCHLORIDE 25 MG/1
25 TABLET, FILM COATED ORAL NIGHTLY PRN
Qty: 90 TABLET | Refills: 1 | Status: SHIPPED | OUTPATIENT
Start: 2025-04-07

## 2025-04-07 ASSESSMENT — PATIENT HEALTH QUESTIONNAIRE - PHQ9
SUM OF ALL RESPONSES TO PHQ QUESTIONS 1-9: 0
2. FEELING DOWN, DEPRESSED OR HOPELESS: NOT AT ALL
1. LITTLE INTEREST OR PLEASURE IN DOING THINGS: NOT AT ALL
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
## 2025-08-25 ENCOUNTER — OFFICE VISIT (OUTPATIENT)
Dept: BEHAVIORAL/MENTAL HEALTH CLINIC | Facility: CLINIC | Age: 55
End: 2025-08-25
Payer: COMMERCIAL

## 2025-08-25 VITALS — HEIGHT: 64 IN | BODY MASS INDEX: 33.4 KG/M2

## 2025-08-25 DIAGNOSIS — G47.00 INSOMNIA, UNSPECIFIED TYPE: ICD-10-CM

## 2025-08-25 DIAGNOSIS — F43.23 ADJUSTMENT DISORDER WITH MIXED ANXIETY AND DEPRESSED MOOD: ICD-10-CM

## 2025-08-25 DIAGNOSIS — F43.10 PTSD (POST-TRAUMATIC STRESS DISORDER): ICD-10-CM

## 2025-08-25 DIAGNOSIS — F33.1 MAJOR DEPRESSIVE DISORDER, RECURRENT, MODERATE (HCC): Primary | ICD-10-CM

## 2025-08-25 DIAGNOSIS — F41.9 ANXIETY DISORDER, UNSPECIFIED TYPE: ICD-10-CM

## 2025-08-25 PROCEDURE — 99214 OFFICE O/P EST MOD 30 MIN: CPT | Performed by: PSYCHIATRY & NEUROLOGY

## 2025-08-25 RX ORDER — ALPRAZOLAM 1 MG/1
.5-1 TABLET ORAL DAILY PRN
Qty: 30 TABLET | Refills: 0 | Status: SHIPPED | OUTPATIENT
Start: 2025-08-25 | End: 2025-10-24

## 2025-08-25 RX ORDER — HYDROXYZINE HYDROCHLORIDE 25 MG/1
25 TABLET, FILM COATED ORAL NIGHTLY PRN
Qty: 90 TABLET | Refills: 1 | Status: SHIPPED | OUTPATIENT
Start: 2025-08-25

## 2025-08-25 ASSESSMENT — PATIENT HEALTH QUESTIONNAIRE - PHQ9
SUM OF ALL RESPONSES TO PHQ QUESTIONS 1-9: 0
2. FEELING DOWN, DEPRESSED OR HOPELESS: NOT AT ALL
SUM OF ALL RESPONSES TO PHQ QUESTIONS 1-9: 0
1. LITTLE INTEREST OR PLEASURE IN DOING THINGS: NOT AT ALL
SUM OF ALL RESPONSES TO PHQ QUESTIONS 1-9: 0
SUM OF ALL RESPONSES TO PHQ QUESTIONS 1-9: 0

## (undated) DEVICE — INTENDED FOR TISSUE SEPARATION, AND OTHER PROCEDURES THAT REQUIRE A SHARP SURGICAL BLADE TO PUNCTURE OR CUT.: Brand: BARD-PARKER ® STAINLESS STEEL BLADES

## (undated) DEVICE — BLADE SHV L13CM DIA4MM CVD EXCALIBUR AGG COOLCUT

## (undated) DEVICE — PREP SKN CHLRAPRP APL 26ML STR --

## (undated) DEVICE — BANDAGE COBAN 6 IN WND 6INX5YD FOAM

## (undated) DEVICE — TUBING PMP L8FT LNG W/ CONN FOR AR-6400 REDEUCE

## (undated) DEVICE — SET IRRIG W 96IN TBNG 4 LN FLX BG

## (undated) DEVICE — PADDING CAST W4INXL4YD ST COT COHESIVE HND TEARABLE SPEC

## (undated) DEVICE — STERILE POLYISOPRENE POWDER-FREE SURGICAL GLOVES: Brand: PROTEXIS

## (undated) DEVICE — STOCKINETTE,IMPERVIOUS,12X48,STERILE: Brand: MEDLINE

## (undated) DEVICE — STRIP,CLOSURE,WOUND,MEDI-STRIP,1/2X4: Brand: MEDLINE

## (undated) DEVICE — GLOVE SURG SZ 65 L12IN FNGR THK79MIL GRN LTX FREE

## (undated) DEVICE — NEEDLE HYPO 18GA L1.5IN THN WALL PIVOTING SHLD BVL ORIENTED

## (undated) DEVICE — AMD ANTIMICROBIAL GAUZE SPONGES,12 PLY USP TYPE VII, 0.2% POLYHEXAMETHYLENE BIGUANIDE HCI (PHMB): Brand: CURITY

## (undated) DEVICE — SPONGE GZ W4XL4IN COT 12 PLY TYP VII WVN C FLD DSGN STERILE

## (undated) DEVICE — TUBING, SUCTION, 1/4" X 10', STRAIGHT: Brand: MEDLINE

## (undated) DEVICE — MASTISOL ADHESIVE LIQ 2/3ML

## (undated) DEVICE — 1010 S-DRAPE TOWEL DRAPE 10/BX: Brand: STERI-DRAPE™

## (undated) DEVICE — STERILE HOOK LOCK LATEX FREE ELASTIC BANDAGE 6INX5YD: Brand: HOOK LOCK™

## (undated) DEVICE — CARDINAL HEALTH FLEXIBLE LIGHT HANDLE COVER: Brand: CARDINAL HEALTH

## (undated) DEVICE — GLOVE SURG SZ 85 L12IN FNGR THK79MIL GRN LTX FREE

## (undated) DEVICE — GOWN,SIRUS,NONRNF,XLN/XL,20/CS: Brand: MEDLINE

## (undated) DEVICE — SET IRRIG DST FLX M CONN

## (undated) DEVICE — 3.0 MM COVAC 50 INTEGRATED CABLE                                    WAND ICW: Brand: COBLATION

## (undated) DEVICE — BLADE SHV CUT MENIS AGG + 4MM --

## (undated) DEVICE — GLOVE SURG SZ 65 CRM LTX FREE POLYISOPRENE POLYMER BEAD ANTI

## (undated) DEVICE — SOLUTION IRRIG 3000ML 0.9% SOD CHL USP UROMATIC PLAS CONT

## (undated) DEVICE — ZIMMER® STERILE DISPOSABLE TOURNIQUET CUFF WITH PLC, DUAL PORT, SINGLE BLADDER, 30 IN. (76 CM)

## (undated) DEVICE — PAD,ABDOMINAL,5"X9",ST,LF,25/BX: Brand: MEDLINE INDUSTRIES, INC.

## (undated) DEVICE — T-DRAPE,EXTREMITY,STERILE: Brand: MEDLINE

## (undated) DEVICE — SYR 50ML LR LCK 1ML GRAD NSAF --

## (undated) DEVICE — KNEE ARTHROSCOPY DR KOCH: Brand: MEDLINE INDUSTRIES, INC.

## (undated) DEVICE — 4-PORT MANIFOLD: Brand: NEPTUNE 2

## (undated) DEVICE — TUBE IRRIG L8IN LNG PT W/ CONN FOR PMP SYS REDEUCE

## (undated) DEVICE — SOLUTION IRRIG 3000ML 0.9% SOD CHL FLX CONT 0797208] ICU MEDICAL INC]

## (undated) DEVICE — KNEE ARTRHOSCOPY DR BAUMGARTEN: Brand: MEDLINE INDUSTRIES, INC.